# Patient Record
Sex: MALE | URBAN - METROPOLITAN AREA
[De-identification: names, ages, dates, MRNs, and addresses within clinical notes are randomized per-mention and may not be internally consistent; named-entity substitution may affect disease eponyms.]

---

## 2019-08-05 ENCOUNTER — OFFICE VISIT (OUTPATIENT)
Dept: URBAN - METROPOLITAN AREA CLINIC 76 | Facility: CLINIC | Age: 74
End: 2019-08-05
Payer: COMMERCIAL

## 2019-08-05 DIAGNOSIS — H25.13 AGE-RELATED NUCLEAR CATARACT, BILATERAL: ICD-10-CM

## 2019-08-05 DIAGNOSIS — H52.4 PRESBYOPIA: Primary | ICD-10-CM

## 2019-08-05 PROCEDURE — 92002 INTRM OPH EXAM NEW PATIENT: CPT | Performed by: OPTOMETRIST

## 2019-08-05 ASSESSMENT — INTRAOCULAR PRESSURE
OD: 14
OS: 13

## 2019-08-05 ASSESSMENT — KERATOMETRY
OS: 43.50
OD: 43.75

## 2019-08-05 ASSESSMENT — VISUAL ACUITY
OD: 20/25
OS: 20/25

## 2020-09-29 ENCOUNTER — LAB OUTSIDE AN ENCOUNTER (OUTPATIENT)
Dept: URBAN - METROPOLITAN AREA CLINIC 96 | Facility: CLINIC | Age: 75
End: 2020-09-29

## 2020-09-29 ENCOUNTER — OFFICE VISIT (OUTPATIENT)
Dept: URBAN - METROPOLITAN AREA CLINIC 96 | Facility: CLINIC | Age: 75
End: 2020-09-29
Payer: MEDICARE

## 2020-09-29 ENCOUNTER — WEB ENCOUNTER (OUTPATIENT)
Dept: URBAN - METROPOLITAN AREA CLINIC 96 | Facility: CLINIC | Age: 75
End: 2020-09-29

## 2020-09-29 DIAGNOSIS — R49.0 HOARSE VOICE QUALITY: ICD-10-CM

## 2020-09-29 DIAGNOSIS — K59.01 CONSTIPATION: ICD-10-CM

## 2020-09-29 DIAGNOSIS — Z09 FOLLOW UP: ICD-10-CM

## 2020-09-29 DIAGNOSIS — K21.9 GERD (GASTROESOPHAGEAL REFLUX DISEASE): ICD-10-CM

## 2020-09-29 DIAGNOSIS — R10.9 ABDOMINAL PAIN: ICD-10-CM

## 2020-09-29 PROCEDURE — G8482 FLU IMMUNIZE ORDER/ADMIN: HCPCS | Performed by: INTERNAL MEDICINE

## 2020-09-29 PROCEDURE — 1036F TOBACCO NON-USER: CPT | Performed by: INTERNAL MEDICINE

## 2020-09-29 PROCEDURE — 99214 OFFICE O/P EST MOD 30 MIN: CPT | Performed by: INTERNAL MEDICINE

## 2020-09-29 PROCEDURE — G8420 CALC BMI NORM PARAMETERS: HCPCS | Performed by: INTERNAL MEDICINE

## 2020-09-29 PROCEDURE — G9622 NO UNHEAL ETOH USER: HCPCS | Performed by: INTERNAL MEDICINE

## 2020-09-29 PROCEDURE — 3017F COLORECTAL CA SCREEN DOC REV: CPT | Performed by: INTERNAL MEDICINE

## 2020-09-29 PROCEDURE — G9903 PT SCRN TBCO ID AS NON USER: HCPCS | Performed by: INTERNAL MEDICINE

## 2020-09-29 PROCEDURE — G8427 DOCREV CUR MEDS BY ELIG CLIN: HCPCS | Performed by: INTERNAL MEDICINE

## 2020-09-29 RX ORDER — ESOMEPRAZOLE MAGNESIUM 20 MG
CAPSULE,DELAYED RELEASE (ENTERIC COATED) ORAL
Qty: 0 | Refills: 0 | Status: ACTIVE | COMMUNITY
Start: 1900-01-01 | End: 1900-01-01

## 2020-09-29 RX ORDER — UBIDECARENONE 100 MG
CAPSULE ORAL
Qty: 0 | Refills: 0 | Status: ACTIVE | COMMUNITY
Start: 1900-01-01 | End: 1900-01-01

## 2020-09-29 RX ORDER — BISOPROLOL FUMARATE 5 MG/1
TABLET, FILM COATED ORAL
Qty: 0 | Refills: 0 | Status: ACTIVE | COMMUNITY
Start: 1900-01-01 | End: 1900-01-01

## 2020-09-29 RX ORDER — OMEPRAZOLE 40 MG/1
1 CAPSULE 30 MINUTES BEFORE MEAL CAPSULE, DELAYED RELEASE ORAL BID
Qty: 60 | Refills: 11 | OUTPATIENT
Start: 2020-09-29

## 2020-09-29 RX ORDER — FINASTERIDE 5 MG/1
TABLET, FILM COATED ORAL
Qty: 0 | Refills: 0 | Status: ACTIVE | COMMUNITY
Start: 1900-01-01 | End: 1900-01-01

## 2020-09-29 RX ORDER — LISINOPRIL AND HYDROCHLOROTHIAZIDE TABLETS 20; 12.5 MG/1; MG/1
TAKE 1 TABLET BY ORAL ROUTE ONCE DAILY TABLET ORAL 1
Qty: 0 | Refills: 0 | Status: ACTIVE | COMMUNITY
Start: 1900-01-01 | End: 1900-01-01

## 2020-09-29 RX ORDER — FAMOTIDINE 40 MG/1
1 TAB TABLET, FILM COATED ORAL BID
Qty: 60 TABLET | Refills: 11 | OUTPATIENT
Start: 2020-09-29

## 2020-09-29 RX ORDER — SIMVASTATIN 40 MG/1
TABLET, FILM COATED ORAL
Qty: 0 | Refills: 0 | Status: ACTIVE | COMMUNITY
Start: 1900-01-01 | End: 1900-01-01

## 2020-09-29 RX ORDER — OMEPRAZOLE 40 MG/1
TAKE 1 CAPSULE BY ORAL ROUTE 2 TIMES A DAY FOR 90 DAYS CAPSULE, DELAYED RELEASE PELLETS ORAL 2
Qty: 180 | Refills: 3 | Status: ACTIVE | COMMUNITY
Start: 2019-11-22 | End: 2020-11-16

## 2020-09-29 NOTE — HPI-TODAY'S VISIT:
Pt Emeka is a 75 y/o individual with a h/o Payan's esophagus with h/o esophagitis, who is here for f/u of his condition. . He takes reglan, Carafate, and omeprazole. . Previously on 8/20/2019, he reports that he has no sxs of reflux.  No nausea, or vomiting.  No abdominal pain or dysphagia.   . Previously on 11/22/2019, pt reports that his throat sxs, abd pain and now constipation has started after stopping his acid therapy.  He takes lot of fiber, 2-tab magnesium, lot of water and exercise.  Has not a BM for about 3 days.  He is on Nexium 40mg once daily. . Today on 9/29/2020, pt reports that things have been going well overall.  He has been having a sore throat.  He fell down in Spring 2020, but has had persistent abdominal pain since then.  Pain also radiates to his right side as well.  Pain is severe in nature, nothing makes it better or worse. He has lost 24lbs of weight via special diet (Isogenic diet), no relation of new diet to his pain.  He has sore throat as well.   .  12/2019: CT abd: diverticulosis, urinary retention, small renal cysts (no further f/u nescssary)  2019: A. Gastric , Biopsy: -Minimal chronic non-active gastritis. -Alcian blue/PAS stain is negative for intestinal metaplasia. -Giemsa stain negative for Helicobacter pylori organisms. Immunohistochemical stain confirmatory. B. GE Junction , Biopsy: -Gastric cardia type mucosa with chronic inflammation and intestinal metaplasia (see comment). -Squamous mucosa with reflux associated changes. -Alcian blue/PAS stain confirms the presence of intestinal metaplasia. -Giemsa stain is negative for Helicobacter pylori organisms. -Negative for dysplasia or malignancy. B. If this biopsy is derived from endoscopically abnormal mucosa in the tubular esophagus, then the  findings meet the ACG diagnostic criteria of Payan's esophagus. Otherwise, this is considered to be  intestinal metaplasia at the GE junction. . 2018 - Esophageal mucosal changes secondary to established short-segment Payan's disease.  Biopsied. - Medium-sized hiatal hernia. - Clear gastric fluid. - Normal stomach. - Normal examined duodenum . - The examined portion of the ileum was normal. - One 5 mm polyp in the ascending colon, removed with a cold biopsy forceps.  Resected and retrieved. - Diverticulosis in the entire examined colon. - Internal hemorrhoids. - Tortuous colon. . A. Distal Esophagus , Biopsy: -Squamous and gastric cardia mucosa with no diagnostic abnormality. -Separate fragments of colonic mucosa with prominent lymphoid aggregates.  -The features of eosinophilic esophagitis are not present.  -Alcian blue/PAS stain is negative for intestinal metaplasia in the gastric mucosa; no fungal organisms are seen. B. Ascending Colon Polyp, Polypectomy: -Fragments of benign squamous mucosa.  -Scant unremarkable gastric epithelium.  -Colonic tissue is not present.  . PMH: HTN (on meds) FH: Colon cancer .

## 2020-10-07 ENCOUNTER — LAB OUTSIDE AN ENCOUNTER (OUTPATIENT)
Dept: URBAN - METROPOLITAN AREA CLINIC 96 | Facility: CLINIC | Age: 75
End: 2020-10-07

## 2020-10-13 ENCOUNTER — TELEPHONE ENCOUNTER (OUTPATIENT)
Dept: URBAN - METROPOLITAN AREA CLINIC 92 | Facility: CLINIC | Age: 75
End: 2020-10-13

## 2020-10-15 ENCOUNTER — TELEPHONE ENCOUNTER (OUTPATIENT)
Dept: URBAN - METROPOLITAN AREA CLINIC 92 | Facility: CLINIC | Age: 75
End: 2020-10-15

## 2020-10-21 ENCOUNTER — OFFICE VISIT (OUTPATIENT)
Dept: URBAN - METROPOLITAN AREA TELEHEALTH 2 | Facility: TELEHEALTH | Age: 75
End: 2020-10-21
Payer: MEDICARE

## 2020-10-21 DIAGNOSIS — R10.9 ABDOMINAL PAIN: ICD-10-CM

## 2020-10-21 DIAGNOSIS — K57.32 DIVERTICULITIS LARGE INTESTINE: ICD-10-CM

## 2020-10-21 DIAGNOSIS — K21.9 GERD (GASTROESOPHAGEAL REFLUX DISEASE): ICD-10-CM

## 2020-10-21 DIAGNOSIS — R93.5 ABNORMAL ABDOMINAL CT SCAN: ICD-10-CM

## 2020-10-21 DIAGNOSIS — Z09 FOLLOW UP: ICD-10-CM

## 2020-10-21 DIAGNOSIS — R49.0 HOARSE VOICE QUALITY: ICD-10-CM

## 2020-10-21 DIAGNOSIS — K59.01 CONSTIPATION: ICD-10-CM

## 2020-10-21 DIAGNOSIS — R10.84 ABDOMINAL CRAMPING, GENERALIZED: ICD-10-CM

## 2020-10-21 PROCEDURE — G9903 PT SCRN TBCO ID AS NON USER: HCPCS | Performed by: INTERNAL MEDICINE

## 2020-10-21 PROCEDURE — G8482 FLU IMMUNIZE ORDER/ADMIN: HCPCS | Performed by: INTERNAL MEDICINE

## 2020-10-21 PROCEDURE — 99215 OFFICE O/P EST HI 40 MIN: CPT | Performed by: INTERNAL MEDICINE

## 2020-10-21 PROCEDURE — 99214 OFFICE O/P EST MOD 30 MIN: CPT | Performed by: INTERNAL MEDICINE

## 2020-10-21 PROCEDURE — G8420 CALC BMI NORM PARAMETERS: HCPCS | Performed by: INTERNAL MEDICINE

## 2020-10-21 PROCEDURE — G8427 DOCREV CUR MEDS BY ELIG CLIN: HCPCS | Performed by: INTERNAL MEDICINE

## 2020-10-21 PROCEDURE — 1036F TOBACCO NON-USER: CPT | Performed by: INTERNAL MEDICINE

## 2020-10-21 PROCEDURE — G9622 NO UNHEAL ETOH USER: HCPCS | Performed by: INTERNAL MEDICINE

## 2020-10-21 PROCEDURE — 3017F COLORECTAL CA SCREEN DOC REV: CPT | Performed by: INTERNAL MEDICINE

## 2020-10-21 RX ORDER — FAMOTIDINE 40 MG/1
1 TAB TABLET, FILM COATED ORAL BID
Qty: 60 TABLET | Refills: 11 | OUTPATIENT

## 2020-10-21 RX ORDER — SIMVASTATIN 40 MG/1
TABLET, FILM COATED ORAL
Qty: 0 | Refills: 0 | Status: ACTIVE | COMMUNITY
Start: 1900-01-01 | End: 1900-01-01

## 2020-10-21 RX ORDER — OMEPRAZOLE 40 MG/1
1 CAPSULE 30 MINUTES BEFORE MEAL CAPSULE, DELAYED RELEASE ORAL BID
Qty: 60 | Refills: 11 | OUTPATIENT

## 2020-10-21 RX ORDER — ESOMEPRAZOLE MAGNESIUM 20 MG
CAPSULE,DELAYED RELEASE (ENTERIC COATED) ORAL
Qty: 0 | Refills: 0 | Status: ACTIVE | COMMUNITY
Start: 1900-01-01 | End: 1900-01-01

## 2020-10-21 RX ORDER — OMEPRAZOLE 40 MG/1
1 CAPSULE 30 MINUTES BEFORE MEAL CAPSULE, DELAYED RELEASE ORAL BID
Qty: 60 | Refills: 11 | Status: ACTIVE | COMMUNITY
Start: 2020-09-29

## 2020-10-21 RX ORDER — BISOPROLOL FUMARATE 5 MG/1
TABLET, FILM COATED ORAL
Qty: 0 | Refills: 0 | Status: ACTIVE | COMMUNITY
Start: 1900-01-01 | End: 1900-01-01

## 2020-10-21 RX ORDER — LISINOPRIL AND HYDROCHLOROTHIAZIDE TABLETS 20; 12.5 MG/1; MG/1
TAKE 1 TABLET BY ORAL ROUTE ONCE DAILY TABLET ORAL 1
Qty: 0 | Refills: 0 | Status: ACTIVE | COMMUNITY
Start: 1900-01-01 | End: 1900-01-01

## 2020-10-21 RX ORDER — FINASTERIDE 5 MG/1
TABLET, FILM COATED ORAL
Qty: 0 | Refills: 0 | Status: ACTIVE | COMMUNITY
Start: 1900-01-01 | End: 1900-01-01

## 2020-10-21 RX ORDER — METRONIDAZOLE 500 MG/1
1 TABLET TABLET, FILM COATED ORAL
Qty: 28 TABLET | Refills: 0 | OUTPATIENT
Start: 2020-10-21 | End: 2020-11-03

## 2020-10-21 RX ORDER — CIPROFLOXACIN HYDROCHLORIDE 500 MG/1
1 TABLET TABLET, FILM COATED ORAL
Qty: 28 TABLET | Refills: 0 | OUTPATIENT
Start: 2020-10-21 | End: 2020-11-03

## 2020-10-21 RX ORDER — UBIDECARENONE 100 MG
CAPSULE ORAL
Qty: 0 | Refills: 0 | Status: ACTIVE | COMMUNITY
Start: 1900-01-01 | End: 1900-01-01

## 2020-10-21 RX ORDER — FAMOTIDINE 40 MG/1
1 TAB TABLET, FILM COATED ORAL BID
Qty: 60 TABLET | Refills: 11 | Status: ACTIVE | COMMUNITY
Start: 2020-09-29

## 2020-10-21 RX ORDER — OMEPRAZOLE 40 MG/1
TAKE 1 CAPSULE BY ORAL ROUTE 2 TIMES A DAY FOR 90 DAYS CAPSULE, DELAYED RELEASE PELLETS ORAL 2
Qty: 180 | Refills: 3 | Status: ACTIVE | COMMUNITY
Start: 2019-11-22 | End: 2020-11-16

## 2020-10-21 NOTE — HPI-TODAY'S VISIT:
Pt Emeka is a 73 y/o individual with a h/o Payan's esophagus with h/o esophagitis, who is here for f/u of his condition. . He takes reglan, Carafate, and omeprazole. . Previously on 8/20/2019, he reports that he has no sxs of reflux.  No nausea, or vomiting.  No abdominal pain or dysphagia.   . Previously on 11/22/2019, pt reports that his throat sxs, abd pain and now constipation has started after stopping his acid therapy.  He takes lot of fiber, 2-tab magnesium, lot of water and exercise.  Has not a BM for about 3 days.  He is on Nexium 40mg once daily. . Previously on 9/29/2020, pt reports that things have been going well overall.  He has been having a sore throat.  He fell down in Spring 2020, but has had persistent abdominal pain since then.  Pain also radiates to his right side as well.  Pain is severe in nature, nothing makes it better or worse. He has lost 24lbs of weight via special diet (Isogenic diet), no relation of new diet to his pain.  He has sore throat as well.   . Today on 10/21/2020, pt reports that he continues have LLQ pain, severe, persistent and nothing makes it better or worse.  Had prostate surgery recently, had some complications from this (bleeding and pain).  . 10/2020: Possible distal sigmoid wall thickening, differential includes neoplasm or short segment colitis. Severe a compression of vertebra. Large Colonic fecal load. Heterogeneous prostate . 12/2019: CT abd: diverticulosis, urinary retention, small renal cysts (no further f/u nescssary)  2019: A. Gastric , Biopsy: -Minimal chronic non-active gastritis. -Alcian blue/PAS stain is negative for intestinal metaplasia. -Giemsa stain negative for Helicobacter pylori organisms. Immunohistochemical stain confirmatory. B. GE Junction , Biopsy: -Gastric cardia type mucosa with chronic inflammation and intestinal metaplasia (see comment). -Squamous mucosa with reflux associated changes. -Alcian blue/PAS stain confirms the presence of intestinal metaplasia. -Giemsa stain is negative for Helicobacter pylori organisms. -Negative for dysplasia or malignancy. B. If this biopsy is derived from endoscopically abnormal mucosa in the tubular esophagus, then the  findings meet the ACG diagnostic criteria of Payan's esophagus. Otherwise, this is considered to be  intestinal metaplasia at the GE junction. . 2018 - Esophageal mucosal changes secondary to established short-segment Payan's disease.  Biopsied. - Medium-sized hiatal hernia. - Clear gastric fluid. - Normal stomach. - Normal examined duodenum . - The examined portion of the ileum was normal. - One 5 mm polyp in the ascending colon, removed with a cold biopsy forceps.  Resected and retrieved. - Diverticulosis in the entire examined colon. - Internal hemorrhoids. - Tortuous colon. . A. Distal Esophagus , Biopsy: -Squamous and gastric cardia mucosa with no diagnostic abnormality. -Separate fragments of colonic mucosa with prominent lymphoid aggregates.  -The features of eosinophilic esophagitis are not present.  -Alcian blue/PAS stain is negative for intestinal metaplasia in the gastric mucosa; no fungal organisms are seen. B. Ascending Colon Polyp, Polypectomy: -Fragments of benign squamous mucosa.  -Scant unremarkable gastric epithelium.  -Colonic tissue is not present.  . PMH: HTN (on meds) FH: Colon cancer .

## 2020-10-22 ENCOUNTER — WEB ENCOUNTER (OUTPATIENT)
Dept: URBAN - METROPOLITAN AREA CLINIC 96 | Facility: CLINIC | Age: 75
End: 2020-10-22

## 2020-10-27 ENCOUNTER — WEB ENCOUNTER (OUTPATIENT)
Dept: URBAN - METROPOLITAN AREA CLINIC 96 | Facility: CLINIC | Age: 75
End: 2020-10-27

## 2020-11-06 ENCOUNTER — OFFICE VISIT (OUTPATIENT)
Dept: URBAN - METROPOLITAN AREA TELEHEALTH 2 | Facility: TELEHEALTH | Age: 75
End: 2020-11-06
Payer: MEDICARE

## 2020-11-06 ENCOUNTER — LAB OUTSIDE AN ENCOUNTER (OUTPATIENT)
Dept: URBAN - METROPOLITAN AREA TELEHEALTH 2 | Facility: TELEHEALTH | Age: 75
End: 2020-11-06

## 2020-11-06 DIAGNOSIS — K59.01 CONSTIPATION: ICD-10-CM

## 2020-11-06 DIAGNOSIS — K21.9 GERD (GASTROESOPHAGEAL REFLUX DISEASE): ICD-10-CM

## 2020-11-06 DIAGNOSIS — K57.32 DIVERTICULITIS LARGE INTESTINE: ICD-10-CM

## 2020-11-06 DIAGNOSIS — Z09 FOLLOW UP: ICD-10-CM

## 2020-11-06 DIAGNOSIS — R10.9 ABDOMINAL PAIN: ICD-10-CM

## 2020-11-06 DIAGNOSIS — R49.0 HOARSE VOICE QUALITY: ICD-10-CM

## 2020-11-06 DIAGNOSIS — R93.5 ABNORMAL ABDOMINAL CT SCAN: ICD-10-CM

## 2020-11-06 PROCEDURE — G9903 PT SCRN TBCO ID AS NON USER: HCPCS | Performed by: INTERNAL MEDICINE

## 2020-11-06 PROCEDURE — G9622 NO UNHEAL ETOH USER: HCPCS | Performed by: INTERNAL MEDICINE

## 2020-11-06 PROCEDURE — G8420 CALC BMI NORM PARAMETERS: HCPCS | Performed by: INTERNAL MEDICINE

## 2020-11-06 PROCEDURE — G8427 DOCREV CUR MEDS BY ELIG CLIN: HCPCS | Performed by: INTERNAL MEDICINE

## 2020-11-06 PROCEDURE — 99214 OFFICE O/P EST MOD 30 MIN: CPT | Performed by: INTERNAL MEDICINE

## 2020-11-06 PROCEDURE — 1036F TOBACCO NON-USER: CPT | Performed by: INTERNAL MEDICINE

## 2020-11-06 PROCEDURE — 3017F COLORECTAL CA SCREEN DOC REV: CPT | Performed by: INTERNAL MEDICINE

## 2020-11-06 PROCEDURE — G8482 FLU IMMUNIZE ORDER/ADMIN: HCPCS | Performed by: INTERNAL MEDICINE

## 2020-11-06 RX ORDER — BISOPROLOL FUMARATE 5 MG/1
TABLET, FILM COATED ORAL
Qty: 0 | Refills: 0 | Status: ACTIVE | COMMUNITY
Start: 1900-01-01 | End: 1900-01-01

## 2020-11-06 RX ORDER — ESOMEPRAZOLE MAGNESIUM 20 MG
CAPSULE,DELAYED RELEASE (ENTERIC COATED) ORAL
Qty: 0 | Refills: 0 | Status: ACTIVE | COMMUNITY
Start: 1900-01-01 | End: 1900-01-01

## 2020-11-06 RX ORDER — SIMVASTATIN 40 MG/1
TABLET, FILM COATED ORAL
Qty: 0 | Refills: 0 | Status: ACTIVE | COMMUNITY
Start: 1900-01-01 | End: 1900-01-01

## 2020-11-06 RX ORDER — FAMOTIDINE 40 MG/1
1 TAB TABLET, FILM COATED ORAL BID
Qty: 60 TABLET | Refills: 11 | Status: ACTIVE | COMMUNITY

## 2020-11-06 RX ORDER — FAMOTIDINE 40 MG/1
1 TAB TABLET, FILM COATED ORAL BID
Qty: 60 TABLET | Refills: 11 | OUTPATIENT

## 2020-11-06 RX ORDER — FINASTERIDE 5 MG/1
TABLET, FILM COATED ORAL
Qty: 0 | Refills: 0 | Status: ACTIVE | COMMUNITY
Start: 1900-01-01 | End: 1900-01-01

## 2020-11-06 RX ORDER — OMEPRAZOLE 40 MG/1
1 CAPSULE 30 MINUTES BEFORE MEAL CAPSULE, DELAYED RELEASE ORAL BID
Qty: 60 | Refills: 11 | OUTPATIENT

## 2020-11-06 RX ORDER — LISINOPRIL AND HYDROCHLOROTHIAZIDE TABLETS 20; 12.5 MG/1; MG/1
TAKE 1 TABLET BY ORAL ROUTE ONCE DAILY TABLET ORAL 1
Qty: 0 | Refills: 0 | Status: ACTIVE | COMMUNITY
Start: 1900-01-01 | End: 1900-01-01

## 2020-11-06 RX ORDER — OMEPRAZOLE 40 MG/1
TAKE 1 CAPSULE BY ORAL ROUTE 2 TIMES A DAY FOR 90 DAYS CAPSULE, DELAYED RELEASE PELLETS ORAL 2
Qty: 180 | Refills: 3 | Status: ACTIVE | COMMUNITY
Start: 2019-11-22 | End: 2020-11-16

## 2020-11-06 RX ORDER — OMEPRAZOLE 40 MG/1
1 CAPSULE 30 MINUTES BEFORE MEAL CAPSULE, DELAYED RELEASE ORAL BID
Qty: 60 | Refills: 11 | Status: ACTIVE | COMMUNITY

## 2020-11-06 RX ORDER — HYOSCYAMINE SULFATE 0.12 MG/1
1 TABLET AS NEEDED TABLET ORAL
Qty: 120 | Refills: 3 | OUTPATIENT
Start: 2020-11-06 | End: 2021-03-06

## 2020-11-06 RX ORDER — UBIDECARENONE 100 MG
CAPSULE ORAL
Qty: 0 | Refills: 0 | Status: ACTIVE | COMMUNITY
Start: 1900-01-01 | End: 1900-01-01

## 2020-11-06 NOTE — HPI-OTHER HISTORIES
Pt Emeka is a 75 y/o individual with a h/o Payan's esophagus with h/o esophagitis, who is here for f/u of his condition. . He takes reglan, Carafate, and omeprazole. . Previously on 8/20/2019, he reports that he has no sxs of reflux.  No nausea, or vomiting.  No abdominal pain or dysphagia.   . Previously on 11/22/2019, pt reports that his throat sxs, abd pain and now constipation has started after stopping his acid therapy.  He takes lot of fiber, 2-tab magnesium, lot of water and exercise.  Has not a BM for about 3 days.  He is on Nexium 40mg once daily. . Previously on 9/29/2020, pt reports that things have been going well overall.  He has been having a sore throat.  He fell down in Spring 2020, but has had persistent abdominal pain since then.  Pain also radiates to his right side as well.  Pain is severe in nature, nothing makes it better or worse. He has lost 24lbs of weight via special diet (Isogenic diet), no relation of new diet to his pain.  He has sore throat as well.   . Previously on 10/21/2020, pt reports that he continues have LLQ pain, severe, persistent and nothing makes it better or worse.  Had prostate surgery recently, had some complications from this (bleeding and pain). , Today on 11/6/2020, pt continues to have the abdominal pain, LLQ in nature, nothing making it better or worse.  Did not get better with the antibiotics.  Pain was there prior to his prostate surgery. . 10/2020: Possible distal sigmoid wall thickening, differential includes neoplasm or short segment colitis. Severe a compression of vertebra. Large Colonic fecal load. Heterogeneous prostate . 12/2019: CT abd: diverticulosis, urinary retention, small renal cysts (no further f/u nescssary)  2019: A. Gastric , Biopsy: -Minimal chronic non-active gastritis. -Alcian blue/PAS stain is negative for intestinal metaplasia. -Giemsa stain negative for Helicobacter pylori organisms. Immunohistochemical stain confirmatory. B. GE Junction , Biopsy: -Gastric cardia type mucosa with chronic inflammation and intestinal metaplasia (see comment). -Squamous mucosa with reflux associated changes. -Alcian blue/PAS stain confirms the presence of intestinal metaplasia. -Giemsa stain is negative for Helicobacter pylori organisms. -Negative for dysplasia or malignancy. B. If this biopsy is derived from endoscopically abnormal mucosa in the tubular esophagus, then the  findings meet the ACG diagnostic criteria of Payan's esophagus. Otherwise, this is considered to be  intestinal metaplasia at the GE junction. . 2018 - Esophageal mucosal changes secondary to established short-segment Payan's disease.  Biopsied. - Medium-sized hiatal hernia. - Clear gastric fluid. - Normal stomach. - Normal examined duodenum . - The examined portion of the ileum was normal. - One 5 mm polyp in the ascending colon, removed with a cold biopsy forceps.  Resected and retrieved. - Diverticulosis in the entire examined colon. - Internal hemorrhoids. - Tortuous colon. . A. Distal Esophagus , Biopsy: -Squamous and gastric cardia mucosa with no diagnostic abnormality. -Separate fragments of colonic mucosa with prominent lymphoid aggregates.  -The features of eosinophilic esophagitis are not present.  -Alcian blue/PAS stain is negative for intestinal metaplasia in the gastric mucosa; no fungal organisms are seen. B. Ascending Colon Polyp, Polypectomy: -Fragments of benign squamous mucosa.  -Scant unremarkable gastric epithelium.  -Colonic tissue is not present.  . PMH: HTN (on meds) FH: Colon cancer .

## 2020-11-12 ENCOUNTER — WEB ENCOUNTER (OUTPATIENT)
Dept: URBAN - METROPOLITAN AREA CLINIC 96 | Facility: CLINIC | Age: 75
End: 2020-11-12

## 2020-11-12 ENCOUNTER — TELEPHONE ENCOUNTER (OUTPATIENT)
Dept: URBAN - METROPOLITAN AREA CLINIC 96 | Facility: CLINIC | Age: 75
End: 2020-11-12

## 2020-11-17 ENCOUNTER — WEB ENCOUNTER (OUTPATIENT)
Dept: URBAN - METROPOLITAN AREA CLINIC 96 | Facility: CLINIC | Age: 75
End: 2020-11-17

## 2020-11-19 ENCOUNTER — LAB OUTSIDE AN ENCOUNTER (OUTPATIENT)
Dept: URBAN - METROPOLITAN AREA CLINIC 96 | Facility: CLINIC | Age: 75
End: 2020-11-19

## 2020-11-19 ENCOUNTER — WEB ENCOUNTER (OUTPATIENT)
Dept: URBAN - METROPOLITAN AREA CLINIC 96 | Facility: CLINIC | Age: 75
End: 2020-11-19

## 2020-11-19 LAB
CREATININE POC: 0.6
PERFORMING LAB: (no result)

## 2020-11-23 ENCOUNTER — TELEPHONE ENCOUNTER (OUTPATIENT)
Dept: URBAN - METROPOLITAN AREA CLINIC 92 | Facility: CLINIC | Age: 75
End: 2020-11-23

## 2020-11-30 ENCOUNTER — TELEPHONE ENCOUNTER (OUTPATIENT)
Dept: URBAN - METROPOLITAN AREA CLINIC 92 | Facility: CLINIC | Age: 75
End: 2020-11-30

## 2020-12-08 ENCOUNTER — WEB ENCOUNTER (OUTPATIENT)
Dept: URBAN - METROPOLITAN AREA CLINIC 96 | Facility: CLINIC | Age: 75
End: 2020-12-08

## 2020-12-08 ENCOUNTER — TELEPHONE ENCOUNTER (OUTPATIENT)
Dept: URBAN - METROPOLITAN AREA CLINIC 96 | Facility: CLINIC | Age: 75
End: 2020-12-08

## 2020-12-15 ENCOUNTER — LAB OUTSIDE AN ENCOUNTER (OUTPATIENT)
Dept: URBAN - METROPOLITAN AREA CLINIC 96 | Facility: CLINIC | Age: 75
End: 2020-12-15

## 2020-12-15 ENCOUNTER — OFFICE VISIT (OUTPATIENT)
Dept: URBAN - METROPOLITAN AREA CLINIC 96 | Facility: CLINIC | Age: 75
End: 2020-12-15
Payer: MEDICARE

## 2020-12-15 ENCOUNTER — WEB ENCOUNTER (OUTPATIENT)
Dept: URBAN - METROPOLITAN AREA CLINIC 96 | Facility: CLINIC | Age: 75
End: 2020-12-15

## 2020-12-15 DIAGNOSIS — R49.0 HOARSE VOICE QUALITY: ICD-10-CM

## 2020-12-15 DIAGNOSIS — K57.32 DIVERTICULITIS LARGE INTESTINE: ICD-10-CM

## 2020-12-15 DIAGNOSIS — K59.01 CONSTIPATION: ICD-10-CM

## 2020-12-15 DIAGNOSIS — Z09 FOLLOW UP: ICD-10-CM

## 2020-12-15 DIAGNOSIS — R10.9 ABDOMINAL PAIN: ICD-10-CM

## 2020-12-15 DIAGNOSIS — Z91.89 COLON CANCER HIGH RISK: ICD-10-CM

## 2020-12-15 DIAGNOSIS — K21.9 GERD (GASTROESOPHAGEAL REFLUX DISEASE): ICD-10-CM

## 2020-12-15 DIAGNOSIS — R93.5 ABNORMAL ABDOMINAL CT SCAN: ICD-10-CM

## 2020-12-15 PROCEDURE — G8427 DOCREV CUR MEDS BY ELIG CLIN: HCPCS | Performed by: INTERNAL MEDICINE

## 2020-12-15 PROCEDURE — G9903 PT SCRN TBCO ID AS NON USER: HCPCS | Performed by: INTERNAL MEDICINE

## 2020-12-15 PROCEDURE — G9622 NO UNHEAL ETOH USER: HCPCS | Performed by: INTERNAL MEDICINE

## 2020-12-15 PROCEDURE — 3017F COLORECTAL CA SCREEN DOC REV: CPT | Performed by: INTERNAL MEDICINE

## 2020-12-15 PROCEDURE — 1036F TOBACCO NON-USER: CPT | Performed by: INTERNAL MEDICINE

## 2020-12-15 PROCEDURE — G8420 CALC BMI NORM PARAMETERS: HCPCS | Performed by: INTERNAL MEDICINE

## 2020-12-15 PROCEDURE — G8482 FLU IMMUNIZE ORDER/ADMIN: HCPCS | Performed by: INTERNAL MEDICINE

## 2020-12-15 PROCEDURE — 99214 OFFICE O/P EST MOD 30 MIN: CPT | Performed by: INTERNAL MEDICINE

## 2020-12-15 RX ORDER — FAMOTIDINE 40 MG/1
1 TAB TABLET, FILM COATED ORAL BID
Qty: 60 TABLET | Refills: 11 | OUTPATIENT

## 2020-12-15 RX ORDER — FINASTERIDE 5 MG/1
TABLET, FILM COATED ORAL
Qty: 0 | Refills: 0 | Status: ACTIVE | COMMUNITY
Start: 1900-01-01 | End: 1900-01-01

## 2020-12-15 RX ORDER — CIPROFLOXACIN HYDROCHLORIDE 500 MG/1
1 TABLET TABLET, FILM COATED ORAL
Qty: 28 TABLET | Refills: 0 | OUTPATIENT
Start: 2020-12-15 | End: 2020-12-29

## 2020-12-15 RX ORDER — BISOPROLOL FUMARATE 5 MG/1
TABLET, FILM COATED ORAL
Qty: 0 | Refills: 0 | Status: ACTIVE | COMMUNITY
Start: 1900-01-01 | End: 1900-01-01

## 2020-12-15 RX ORDER — UBIDECARENONE 100 MG
CAPSULE ORAL
Qty: 0 | Refills: 0 | Status: ACTIVE | COMMUNITY
Start: 1900-01-01 | End: 1900-01-01

## 2020-12-15 RX ORDER — LISINOPRIL AND HYDROCHLOROTHIAZIDE TABLETS 20; 12.5 MG/1; MG/1
TAKE 1 TABLET BY ORAL ROUTE ONCE DAILY TABLET ORAL 1
Qty: 0 | Refills: 0 | Status: ACTIVE | COMMUNITY
Start: 1900-01-01 | End: 1900-01-01

## 2020-12-15 RX ORDER — ESOMEPRAZOLE MAGNESIUM 20 MG
CAPSULE,DELAYED RELEASE (ENTERIC COATED) ORAL
Qty: 0 | Refills: 0 | Status: ACTIVE | COMMUNITY
Start: 1900-01-01 | End: 1900-01-01

## 2020-12-15 RX ORDER — OMEPRAZOLE 40 MG/1
1 CAPSULE 30 MINUTES BEFORE MEAL CAPSULE, DELAYED RELEASE ORAL BID
Qty: 60 | Refills: 11 | OUTPATIENT

## 2020-12-15 RX ORDER — HYOSCYAMINE SULFATE 0.12 MG/1
1 TABLET AS NEEDED TABLET ORAL
Qty: 120 | Refills: 3 | Status: ACTIVE | COMMUNITY
Start: 2020-11-06 | End: 2021-03-06

## 2020-12-15 RX ORDER — METRONIDAZOLE 500 MG/1
1 TABLET TABLET, FILM COATED ORAL
Qty: 28 TABLET | Refills: 0 | OUTPATIENT
Start: 2020-12-15 | End: 2020-12-29

## 2020-12-15 RX ORDER — HYOSCYAMINE SULFATE 0.12 MG/1
1 TABLET AS NEEDED TABLET ORAL
Qty: 120 | Refills: 3 | OUTPATIENT

## 2020-12-15 RX ORDER — SIMVASTATIN 40 MG/1
TABLET, FILM COATED ORAL
Qty: 0 | Refills: 0 | Status: ACTIVE | COMMUNITY
Start: 1900-01-01 | End: 1900-01-01

## 2020-12-15 RX ORDER — FAMOTIDINE 40 MG/1
1 TAB TABLET, FILM COATED ORAL BID
Qty: 60 TABLET | Refills: 11 | Status: ACTIVE | COMMUNITY

## 2020-12-15 RX ORDER — OMEPRAZOLE 40 MG/1
1 CAPSULE 30 MINUTES BEFORE MEAL CAPSULE, DELAYED RELEASE ORAL BID
Qty: 60 | Refills: 11 | Status: ACTIVE | COMMUNITY

## 2020-12-15 NOTE — HPI-TODAY'S VISIT:
Pt Emeka is a 73 y/o individual with a h/o Payan's esophagus with h/o esophagitis, who is here for f/u of his condition. . He takes reglan, Carafate, and omeprazole. . Previously on 8/20/2019, he reports that he has no sxs of reflux.  No nausea, or vomiting.  No abdominal pain or dysphagia.   . Previously on 11/22/2019, pt reports that his throat sxs, abd pain and now constipation has started after stopping his acid therapy.  He takes lot of fiber, 2-tab magnesium, lot of water and exercise.  Has not a BM for about 3 days.  He is on Nexium 40mg once daily. . Previously on 9/29/2020, pt reports that things have been going well overall.  He has been having a sore throat.  He fell down in Spring 2020, but has had persistent abdominal pain since then.  Pain also radiates to his right side as well.  Pain is severe in nature, nothing makes it better or worse. He has lost 24lbs of weight via special diet (Isogenic diet), no relation of new diet to his pain.  He has sore throat as well.   . Previously on 10/21/2020, pt reports that he continues have LLQ pain, severe, persistent and nothing makes it better or worse.  Had prostate surgery recently, had some complications from this (bleeding and pain). , Previously on 11/6/2020, pt continues to have the abdominal pain, LLQ in nature, nothing making it better or worse.  Did not get better with the antibiotics.  Pain was there prior to his prostate surgery. . Today on 12/15/2020, pt reports that he has constant pain in his abdomen on the left and right side.  He is taking Isoflush (which has magnesium supplement in it).  Also taking citrcel 2 tab daily.  He was given a 6 days steroid pack, which helped his pain somewhat.  Was also given Tramadol for the pain.  (Did not want to take narcotic given the constipation). . CT ABD  11/2020: 1. Possible distal sigmoid colonic wall thickening was seen on a CT performed on October 7, 2020, but no definite colonic wall thickening is identified today. Correlation with recent colonoscopy findings is recommended. 2. Diffuse stool within the colon, concerning for constipation. 3. Stable severe compression fracture of L2. . 10/2020: Possible distal sigmoid wall thickening, differential includes neoplasm or short segment colitis. Severe a compression of vertebra. Large Colonic fecal load. Heterogeneous prostate . 12/2019: CT abd: diverticulosis, urinary retention, small renal cysts (no further f/u nescssary)  2019: A. Gastric , Biopsy: -Minimal chronic non-active gastritis. -Alcian blue/PAS stain is negative for intestinal metaplasia. -Giemsa stain negative for Helicobacter pylori organisms. Immunohistochemical stain confirmatory. B. GE Junction , Biopsy: -Gastric cardia type mucosa with chronic inflammation and intestinal metaplasia (see comment). -Squamous mucosa with reflux associated changes. -Alcian blue/PAS stain confirms the presence of intestinal metaplasia. -Giemsa stain is negative for Helicobacter pylori organisms. -Negative for dysplasia or malignancy. B. If this biopsy is derived from endoscopically abnormal mucosa in the tubular esophagus, then the  findings meet the [facility] diagnostic criteria of Payan's esophagus. Otherwise, this is considered to be  intestinal metaplasia at the GE junction. . 2018 - Esophageal mucosal changes secondary to established short-segment Payan's disease.  Biopsied. - Medium-sized hiatal hernia. - Clear gastric fluid. - Normal stomach. - Normal examined duodenum . - The examined portion of the ileum was normal. - One 5 mm polyp in the ascending colon, removed with a cold biopsy forceps.  Resected and retrieved. - Diverticulosis in the entire examined colon. - Internal hemorrhoids. - Tortuous colon. . A. Distal Esophagus , Biopsy: -Squamous and gastric cardia mucosa with no diagnostic abnormality. -Separate fragments of colonic mucosa with prominent lymphoid aggregates.  -The features of eosinophilic esophagitis are not present.  -Alcian blue/PAS stain is negative for intestinal metaplasia in the gastric mucosa; no fungal organisms are seen. B. Ascending Colon Polyp, Polypectomy: -Fragments of benign squamous mucosa.  -Scant unremarkable gastric epithelium.  -Colonic tissue is not present.  . PMH: HTN (on meds) FH: Colon cancer .

## 2020-12-18 ENCOUNTER — WEB ENCOUNTER (OUTPATIENT)
Dept: URBAN - METROPOLITAN AREA CLINIC 96 | Facility: CLINIC | Age: 75
End: 2020-12-18

## 2020-12-28 ENCOUNTER — WEB ENCOUNTER (OUTPATIENT)
Dept: URBAN - METROPOLITAN AREA CLINIC 96 | Facility: CLINIC | Age: 75
End: 2020-12-28

## 2021-01-20 ENCOUNTER — OFFICE VISIT (OUTPATIENT)
Dept: URBAN - METROPOLITAN AREA TELEHEALTH 2 | Facility: TELEHEALTH | Age: 76
End: 2021-01-20
Payer: MEDICARE

## 2021-01-20 DIAGNOSIS — K21.9 GERD (GASTROESOPHAGEAL REFLUX DISEASE): ICD-10-CM

## 2021-01-20 DIAGNOSIS — K59.01 CONSTIPATION: ICD-10-CM

## 2021-01-20 DIAGNOSIS — K57.32 DIVERTICULITIS LARGE INTESTINE: ICD-10-CM

## 2021-01-20 DIAGNOSIS — R10.84 GENERALIZED ABDOMINAL PAIN: ICD-10-CM

## 2021-01-20 PROCEDURE — G8420 CALC BMI NORM PARAMETERS: HCPCS | Performed by: INTERNAL MEDICINE

## 2021-01-20 PROCEDURE — G8482 FLU IMMUNIZE ORDER/ADMIN: HCPCS | Performed by: INTERNAL MEDICINE

## 2021-01-20 PROCEDURE — G9903 PT SCRN TBCO ID AS NON USER: HCPCS | Performed by: INTERNAL MEDICINE

## 2021-01-20 PROCEDURE — 1036F TOBACCO NON-USER: CPT | Performed by: INTERNAL MEDICINE

## 2021-01-20 PROCEDURE — G8427 DOCREV CUR MEDS BY ELIG CLIN: HCPCS | Performed by: INTERNAL MEDICINE

## 2021-01-20 PROCEDURE — G9622 NO UNHEAL ETOH USER: HCPCS | Performed by: INTERNAL MEDICINE

## 2021-01-20 PROCEDURE — 99215 OFFICE O/P EST HI 40 MIN: CPT | Performed by: INTERNAL MEDICINE

## 2021-01-20 PROCEDURE — 3017F COLORECTAL CA SCREEN DOC REV: CPT | Performed by: INTERNAL MEDICINE

## 2021-01-20 RX ORDER — NORTRIPTYLINE HYDROCHLORIDE 10 MG/1
2 CAPSULE CAPSULE ORAL ONCE A DAY
Qty: 60 CAPSULE | Refills: 11 | OUTPATIENT
Start: 2021-01-20

## 2021-01-20 RX ORDER — LISINOPRIL AND HYDROCHLOROTHIAZIDE TABLETS 20; 12.5 MG/1; MG/1
TAKE 1 TABLET BY ORAL ROUTE ONCE DAILY TABLET ORAL 1
Qty: 0 | Refills: 0 | Status: ACTIVE | COMMUNITY
Start: 1900-01-01 | End: 1900-01-01

## 2021-01-20 RX ORDER — OMEPRAZOLE 40 MG/1
1 CAPSULE 30 MINUTES BEFORE MEAL CAPSULE, DELAYED RELEASE ORAL BID
Qty: 60 | Refills: 11 | Status: ACTIVE | COMMUNITY

## 2021-01-20 RX ORDER — FAMOTIDINE 40 MG/1
1 TAB TABLET, FILM COATED ORAL BID
Qty: 60 TABLET | Refills: 11 | Status: ACTIVE | COMMUNITY

## 2021-01-20 RX ORDER — ESOMEPRAZOLE MAGNESIUM 20 MG
CAPSULE,DELAYED RELEASE (ENTERIC COATED) ORAL
Qty: 0 | Refills: 0 | Status: ACTIVE | COMMUNITY
Start: 1900-01-01 | End: 1900-01-01

## 2021-01-20 RX ORDER — FAMOTIDINE 40 MG/1
1 TAB TABLET, FILM COATED ORAL BID
Qty: 60 TABLET | Refills: 11 | OUTPATIENT

## 2021-01-20 RX ORDER — HYOSCYAMINE SULFATE 0.12 MG/1
1 TABLET AS NEEDED TABLET ORAL
Qty: 120 | Refills: 3 | Status: ACTIVE | COMMUNITY

## 2021-01-20 RX ORDER — UBIDECARENONE 100 MG
CAPSULE ORAL
Qty: 0 | Refills: 0 | Status: ACTIVE | COMMUNITY
Start: 1900-01-01 | End: 1900-01-01

## 2021-01-20 RX ORDER — SIMVASTATIN 40 MG/1
TABLET, FILM COATED ORAL
Qty: 0 | Refills: 0 | Status: ACTIVE | COMMUNITY
Start: 1900-01-01 | End: 1900-01-01

## 2021-01-20 RX ORDER — FINASTERIDE 5 MG/1
TABLET, FILM COATED ORAL
Qty: 0 | Refills: 0 | Status: ACTIVE | COMMUNITY
Start: 1900-01-01 | End: 1900-01-01

## 2021-01-20 RX ORDER — OMEPRAZOLE 40 MG/1
1 CAPSULE 30 MINUTES BEFORE MEAL CAPSULE, DELAYED RELEASE ORAL BID
Qty: 60 | Refills: 11 | OUTPATIENT

## 2021-01-20 RX ORDER — HYOSCYAMINE SULFATE 0.12 MG/1
1 TABLET AS NEEDED TABLET ORAL
Qty: 120 | Refills: 3 | OUTPATIENT

## 2021-01-20 RX ORDER — BISOPROLOL FUMARATE 5 MG/1
TABLET, FILM COATED ORAL
Qty: 0 | Refills: 0 | Status: ACTIVE | COMMUNITY
Start: 1900-01-01 | End: 1900-01-01

## 2021-01-20 NOTE — HPI-OTHER HISTORIES
Pt Emeka is a 76 y/o individual with a h/o Payan's esophagus with h/o esophagitis, who is here for f/u of his condition. . He takes reglan, Carafate, and omeprazole. . Previously on 8/20/2019, he reports that he has no sxs of reflux.  No nausea, or vomiting.  No abdominal pain or dysphagia.   . Previously on 11/22/2019, pt reports that his throat sxs, abd pain and now constipation has started after stopping his acid therapy.  He takes lot of fiber, 2-tab magnesium, lot of water and exercise.  Has not a BM for about 3 days.  He is on Nexium 40mg once daily. . Previously on 9/29/2020, pt reports that things have been going well overall.  He has been having a sore throat.  He fell down in Spring 2020, but has had persistent abdominal pain since then.  Pain also radiates to his right side as well.  Pain is severe in nature, nothing makes it better or worse. He has lost 24lbs of weight via special diet (Isogenic diet), no relation of new diet to his pain.  He has sore throat as well.   . Previously on 10/21/2020, pt reports that he continues have LLQ pain, severe, persistent and nothing makes it better or worse.  Had prostate surgery recently, had some complications from this (bleeding and pain). , Previously on 11/6/2020, pt continues to have the abdominal pain, LLQ in nature, nothing making it better or worse.  Did not get better with the antibiotics.  Pain was there prior to his prostate surgery. . Previously on 12/15/2020, pt reports that he has constant pain in his abdomen on the left and right side.  He is taking Isoflush (which has magnesium supplement in it).  Also taking citrcel 2 tab daily.  He was given a 6 days steroid pack, which helped his pain somewhat.  Was also given Tramadol for the pain.  (Did not want to take narcotic given the constipation). . Today on 1/19/2021, pt reports that he injured his back last week (saw PCP and started on muscle relaxer and pain pill).  He completed the 2 weeks of antibiotics course in 12/2020, did not see much improvement in his abdominal pain.  Also seeing some pain in the right side of the abdomen, feels like sore muscles in the rib cage.  Persistent sore throat, which has been linked to his sinus drainage.  Nothing makes the abd pain better or worse.  He has been on magnesium for many years, isoflush as well and fiber one cereal.  Having daily BM.  . CT ABD  11/2020: 1. Possible distal sigmoid colonic wall thickening was seen on a CT performed on October 7, 2020, but no definite colonic wall thickening is identified today. Correlation with recent colonoscopy findings is recommended. 2. Diffuse stool within the colon, concerning for constipation. 3. Stable severe compression fracture of L2. . 10/2020: Possible distal sigmoid wall thickening, differential includes neoplasm or short segment colitis. Severe a compression of vertebra. Large Colonic fecal load. Heterogeneous prostate . 12/2019: CT abd: diverticulosis, urinary retention, small renal cysts (no further f/u nescssary)  2019: A. Gastric , Biopsy: -Minimal chronic non-active gastritis. -Alcian blue/PAS stain is negative for intestinal metaplasia. -Giemsa stain negative for Helicobacter pylori organisms. Immunohistochemical stain confirmatory. B. GE Junction , Biopsy: -Gastric cardia type mucosa with chronic inflammation and intestinal metaplasia (see comment). -Squamous mucosa with reflux associated changes. -Alcian blue/PAS stain confirms the presence of intestinal metaplasia. -Giemsa stain is negative for Helicobacter pylori organisms. -Negative for dysplasia or malignancy. B. If this biopsy is derived from endoscopically abnormal mucosa in the tubular esophagus, then the  findings meet the ACG diagnostic criteria of Payan's esophagus. Otherwise, this is considered to be  intestinal metaplasia at the GE junction. . 2018 - Esophageal mucosal changes secondary to established short-segment Payan's disease.  Biopsied. - Medium-sized hiatal hernia. - Clear gastric fluid. - Normal stomach. - Normal examined duodenum . - The examined portion of the ileum was normal. - One 5 mm polyp in the ascending colon, removed with a cold biopsy forceps.  Resected and retrieved. - Diverticulosis in the entire examined colon. - Internal hemorrhoids. - Tortuous colon. . A. Distal Esophagus , Biopsy: -Squamous and gastric cardia mucosa with no diagnostic abnormality. -Separate fragments of colonic mucosa with prominent lymphoid aggregates.  -The features of eosinophilic esophagitis are not present.  -Alcian blue/PAS stain is negative for intestinal metaplasia in the gastric mucosa; no fungal organisms are seen. B. Ascending Colon Polyp, Polypectomy: -Fragments of benign squamous mucosa.  -Scant unremarkable gastric epithelium.  -Colonic tissue is not present.  . PMH: HTN (on meds) FH: Colon cancer

## 2021-01-21 ENCOUNTER — WEB ENCOUNTER (OUTPATIENT)
Dept: URBAN - METROPOLITAN AREA CLINIC 96 | Facility: CLINIC | Age: 76
End: 2021-01-21

## 2021-01-22 ENCOUNTER — WEB ENCOUNTER (OUTPATIENT)
Dept: URBAN - METROPOLITAN AREA CLINIC 96 | Facility: CLINIC | Age: 76
End: 2021-01-22

## 2021-01-23 ENCOUNTER — WEB ENCOUNTER (OUTPATIENT)
Dept: URBAN - METROPOLITAN AREA CLINIC 96 | Facility: CLINIC | Age: 76
End: 2021-01-23

## 2021-01-25 ENCOUNTER — WEB ENCOUNTER (OUTPATIENT)
Dept: URBAN - METROPOLITAN AREA CLINIC 96 | Facility: CLINIC | Age: 76
End: 2021-01-25

## 2021-01-29 ENCOUNTER — WEB ENCOUNTER (OUTPATIENT)
Dept: URBAN - METROPOLITAN AREA CLINIC 96 | Facility: CLINIC | Age: 76
End: 2021-01-29

## 2021-02-04 ENCOUNTER — WEB ENCOUNTER (OUTPATIENT)
Dept: URBAN - METROPOLITAN AREA CLINIC 96 | Facility: CLINIC | Age: 76
End: 2021-02-04

## 2021-03-09 ENCOUNTER — WEB ENCOUNTER (OUTPATIENT)
Dept: URBAN - METROPOLITAN AREA CLINIC 96 | Facility: CLINIC | Age: 76
End: 2021-03-09

## 2021-03-09 ENCOUNTER — LAB OUTSIDE AN ENCOUNTER (OUTPATIENT)
Dept: URBAN - METROPOLITAN AREA CLINIC 96 | Facility: CLINIC | Age: 76
End: 2021-03-09

## 2021-03-09 ENCOUNTER — OFFICE VISIT (OUTPATIENT)
Dept: URBAN - METROPOLITAN AREA CLINIC 96 | Facility: CLINIC | Age: 76
End: 2021-03-09
Payer: MEDICARE

## 2021-03-09 DIAGNOSIS — K22.70 BARRETT ESOPHAGUS: ICD-10-CM

## 2021-03-09 DIAGNOSIS — K63.5 COLON POLYP: ICD-10-CM

## 2021-03-09 DIAGNOSIS — R93.5 ABNORMAL ABDOMINAL CT SCAN: ICD-10-CM

## 2021-03-09 DIAGNOSIS — K59.01 CONSTIPATION: ICD-10-CM

## 2021-03-09 DIAGNOSIS — K58.9 IBS (IRRITABLE BOWEL SYNDROME): ICD-10-CM

## 2021-03-09 DIAGNOSIS — Z09 FOLLOW UP: ICD-10-CM

## 2021-03-09 DIAGNOSIS — R10.9 ABDOMINAL PAIN: ICD-10-CM

## 2021-03-09 DIAGNOSIS — K57.32 DIVERTICULITIS LARGE INTESTINE: ICD-10-CM

## 2021-03-09 DIAGNOSIS — R49.0 HOARSE VOICE QUALITY: ICD-10-CM

## 2021-03-09 DIAGNOSIS — Z91.89 COLON CANCER HIGH RISK: ICD-10-CM

## 2021-03-09 DIAGNOSIS — K21.9 GERD (GASTROESOPHAGEAL REFLUX DISEASE): ICD-10-CM

## 2021-03-09 PROCEDURE — 99214 OFFICE O/P EST MOD 30 MIN: CPT | Performed by: INTERNAL MEDICINE

## 2021-03-09 RX ORDER — LISINOPRIL AND HYDROCHLOROTHIAZIDE TABLETS 20; 12.5 MG/1; MG/1
TAKE 1 TABLET BY ORAL ROUTE ONCE DAILY TABLET ORAL 1
Qty: 0 | Refills: 0 | COMMUNITY
Start: 1900-01-01

## 2021-03-09 RX ORDER — HYOSCYAMINE SULFATE 0.12 MG/1
1 TABLET AS NEEDED TABLET ORAL
Qty: 120 | Refills: 3 | COMMUNITY

## 2021-03-09 RX ORDER — ESOMEPRAZOLE MAGNESIUM 20 MG
CAPSULE,DELAYED RELEASE (ENTERIC COATED) ORAL
Qty: 0 | Refills: 0 | COMMUNITY
Start: 1900-01-01

## 2021-03-09 RX ORDER — SODIUM, POTASSIUM,MAG SULFATES 17.5-3.13G
TAKE 177 ML SOLUTION, RECONSTITUTED, ORAL ORAL
Qty: 177 ML | Refills: 0 | OUTPATIENT
Start: 2021-03-09 | End: 2021-03-10

## 2021-03-09 RX ORDER — NORTRIPTYLINE HYDROCHLORIDE 10 MG/1
2 CAPSULE CAPSULE ORAL ONCE A DAY
Qty: 60 CAPSULE | Refills: 11 | COMMUNITY
Start: 2021-01-20

## 2021-03-09 RX ORDER — FAMOTIDINE 40 MG/1
1 TAB TABLET, FILM COATED ORAL BID
Qty: 60 TABLET | Refills: 11 | COMMUNITY

## 2021-03-09 RX ORDER — OMEPRAZOLE 40 MG/1
1 CAPSULE 30 MINUTES BEFORE MEAL CAPSULE, DELAYED RELEASE ORAL BID
Qty: 60 | Refills: 11 | OUTPATIENT

## 2021-03-09 RX ORDER — BISOPROLOL FUMARATE 5 MG/1
TABLET, FILM COATED ORAL
Qty: 0 | Refills: 0 | COMMUNITY
Start: 1900-01-01

## 2021-03-09 RX ORDER — UBIDECARENONE 100 MG
CAPSULE ORAL
Qty: 0 | Refills: 0 | COMMUNITY
Start: 1900-01-01

## 2021-03-09 RX ORDER — FINASTERIDE 5 MG/1
TABLET, FILM COATED ORAL
Qty: 0 | Refills: 0 | COMMUNITY
Start: 1900-01-01

## 2021-03-09 RX ORDER — HYOSCYAMINE SULFATE 0.12 MG/1
1 TABLET AS NEEDED TABLET ORAL
Qty: 120 | Refills: 3 | OUTPATIENT

## 2021-03-09 RX ORDER — SIMVASTATIN 40 MG/1
TABLET, FILM COATED ORAL
Qty: 0 | Refills: 0 | COMMUNITY
Start: 1900-01-01

## 2021-03-09 RX ORDER — OMEPRAZOLE 40 MG/1
1 CAPSULE 30 MINUTES BEFORE MEAL CAPSULE, DELAYED RELEASE ORAL BID
Qty: 60 | Refills: 11 | COMMUNITY

## 2021-03-09 RX ORDER — NORTRIPTYLINE HYDROCHLORIDE 10 MG/1
2 CAPSULE CAPSULE ORAL ONCE A DAY
Qty: 60 CAPSULE | Refills: 11 | OUTPATIENT

## 2021-03-09 RX ORDER — FAMOTIDINE 40 MG/1
1 TAB TABLET, FILM COATED ORAL BID
Qty: 60 TABLET | Refills: 11 | OUTPATIENT

## 2021-03-09 NOTE — HPI-OTHER HISTORIES
Pt Emeka is a 74 y/o individual with a h/o Payan's esophagus with h/o esophagitis, who is here for f/u of his condition. . He takes reglan, Carafate, and omeprazole. . Previously on 8/20/2019, he reports that he has no sxs of reflux.  No nausea, or vomiting.  No abdominal pain or dysphagia.   . Previously on 11/22/2019, pt reports that his throat sxs, abd pain and now constipation has started after stopping his acid therapy.  He takes lot of fiber, 2-tab magnesium, lot of water and exercise.  Has not a BM for about 3 days.  He is on Nexium 40mg once daily. . Previously on 9/29/2020, pt reports that things have been going well overall.  He has been having a sore throat.  He fell down in Spring 2020, but has had persistent abdominal pain since then.  Pain also radiates to his right side as well.  Pain is severe in nature, nothing makes it better or worse. He has lost 24lbs of weight via special diet (Isogenic diet), no relation of new diet to his pain.  He has sore throat as well.   . Previously on 10/21/2020, pt reports that he continues have LLQ pain, severe, persistent and nothing makes it better or worse.  Had prostate surgery recently, had some complications from this (bleeding and pain). , Previously on 11/6/2020, pt continues to have the abdominal pain, LLQ in nature, nothing making it better or worse.  Did not get better with the antibiotics.  Pain was there prior to his prostate surgery. . Previously on 12/15/2020, pt reports that he has constant pain in his abdomen on the left and right side.  He is taking Isoflush (which has magnesium supplement in it).  Also taking citrcel 2 tab daily.  He was given a 6 days steroid pack, which helped his pain somewhat.  Was also given Tramadol for the pain.  (Did not want to take narcotic given the constipation). . Previously on 1/19/2021, pt reports that he injured his back last week (saw PCP and started on muscle relaxer and pain pill).  He completed the 2 weeks of antibiotics course in 12/2020, did not see much improvement in his abdominal pain.  Also seeing some pain in the right side of the abdomen, feels like sore muscles in the rib cage.  Persistent sore throat, which has been linked to his sinus drainage.  Nothing makes the abd pain better or worse.  He has been on magnesium for many years, isoflush as well and fiber one cereal.  Having daily BM. . Today on 3/8/2021, pt reports that his abd pain is persistent but varies throughout the day, worse with activity.  Feels like muscle pain (had recent fall, but present for 1 year).  Having 2 BM per day with the high bran diet, isoflush, 2 Magoxide 400mg, 2 citrcels per day.  .  Took nortryptyline, 2 tab per day, which did not help much for the pain (but did help his back pain). . CT ABD  11/2020: 1. Possible distal sigmoid colonic wall thickening was seen on a CT performed on October 7, 2020, but no definite colonic wall thickening is identified today. Correlation with recent colonoscopy findings is recommended. 2. Diffuse stool within the colon, concerning for constipation. 3. Stable severe compression fracture of L2. . 10/2020: Possible distal sigmoid wall thickening, differential includes neoplasm or short segment colitis. Severe a compression of vertebra. Large Colonic fecal load. Heterogeneous prostate . 12/2019: CT abd: diverticulosis, urinary retention, small renal cysts (no further f/u nescssary)  2019: A. Gastric , Biopsy: -Minimal chronic non-active gastritis. -Alcian blue/PAS stain is negative for intestinal metaplasia. -Giemsa stain negative for Helicobacter pylori organisms. Immunohistochemical stain confirmatory. B. GE Junction , Biopsy: -Gastric cardia type mucosa with chronic inflammation and intestinal metaplasia (see comment). -Squamous mucosa with reflux associated changes. -Alcian blue/PAS stain confirms the presence of intestinal metaplasia. -Giemsa stain is negative for Helicobacter pylori organisms. -Negative for dysplasia or malignancy. B. If this biopsy is derived from endoscopically abnormal mucosa in the tubular esophagus, then the  findings meet the ACG diagnostic criteria of Payan's esophagus. Otherwise, this is considered to be  intestinal metaplasia at the GE junction. . 2018 - Esophageal mucosal changes secondary to established short-segment Payan's disease.  Biopsied. - Medium-sized hiatal hernia. - Clear gastric fluid. - Normal stomach. - Normal examined duodenum . - The examined portion of the ileum was normal. - One 5 mm polyp in the ascending colon, removed with a cold biopsy forceps.  Resected and retrieved. - Diverticulosis in the entire examined colon. - Internal hemorrhoids. - Tortuous colon. . A. Distal Esophagus , Biopsy: -Squamous and gastric cardia mucosa with no diagnostic abnormality. -Separate fragments of colonic mucosa with prominent lymphoid aggregates.  -The features of eosinophilic esophagitis are not present.  -Alcian blue/PAS stain is negative for intestinal metaplasia in the gastric mucosa; no fungal organisms are seen. B. Ascending Colon Polyp, Polypectomy: -Fragments of benign squamous mucosa.  -Scant unremarkable gastric epithelium.  -Colonic tissue is not present.  . PMH: HTN (on meds) FH: Colon cancer .

## 2021-03-12 ENCOUNTER — WEB ENCOUNTER (OUTPATIENT)
Dept: URBAN - METROPOLITAN AREA CLINIC 96 | Facility: CLINIC | Age: 76
End: 2021-03-12

## 2021-03-19 ENCOUNTER — WEB ENCOUNTER (OUTPATIENT)
Dept: URBAN - METROPOLITAN AREA CLINIC 96 | Facility: CLINIC | Age: 76
End: 2021-03-19

## 2021-03-25 ENCOUNTER — CLAIMS CREATED FROM THE CLAIM WINDOW (OUTPATIENT)
Dept: URBAN - METROPOLITAN AREA CLINIC 4 | Facility: CLINIC | Age: 76
End: 2021-03-25
Payer: MEDICARE

## 2021-03-25 ENCOUNTER — OFFICE VISIT (OUTPATIENT)
Dept: URBAN - METROPOLITAN AREA SURGERY CENTER 18 | Facility: SURGERY CENTER | Age: 76
End: 2021-03-25
Payer: MEDICARE

## 2021-03-25 DIAGNOSIS — K31.89 ACQUIRED DEFORMITY OF DUODENUM: ICD-10-CM

## 2021-03-25 DIAGNOSIS — K31.89 ISOLATED IDIOPATHIC GRANULOMA OF STOMACH: ICD-10-CM

## 2021-03-25 DIAGNOSIS — Z86.010 H/O ADENOMATOUS POLYP OF COLON: ICD-10-CM

## 2021-03-25 DIAGNOSIS — R12 BURNING REFLUX: ICD-10-CM

## 2021-03-25 PROCEDURE — 88305 TISSUE EXAM BY PATHOLOGIST: CPT | Performed by: PATHOLOGY

## 2021-03-25 PROCEDURE — G8907 PT DOC NO EVENTS ON DISCHARG: HCPCS | Performed by: INTERNAL MEDICINE

## 2021-03-25 PROCEDURE — 43239 EGD BIOPSY SINGLE/MULTIPLE: CPT | Performed by: INTERNAL MEDICINE

## 2021-03-25 PROCEDURE — 88312 SPECIAL STAINS GROUP 1: CPT | Performed by: PATHOLOGY

## 2021-03-25 PROCEDURE — G0105 COLORECTAL SCRN; HI RISK IND: HCPCS | Performed by: INTERNAL MEDICINE

## 2021-03-25 RX ORDER — FAMOTIDINE 40 MG/1
1 TAB TABLET, FILM COATED ORAL BID
Qty: 60 TABLET | Refills: 11 | Status: ACTIVE | COMMUNITY

## 2021-03-25 RX ORDER — LISINOPRIL AND HYDROCHLOROTHIAZIDE TABLETS 20; 12.5 MG/1; MG/1
TAKE 1 TABLET BY ORAL ROUTE ONCE DAILY TABLET ORAL 1
Qty: 0 | Refills: 0 | COMMUNITY
Start: 1900-01-01

## 2021-03-25 RX ORDER — BISOPROLOL FUMARATE 5 MG/1
TABLET, FILM COATED ORAL
Qty: 0 | Refills: 0 | COMMUNITY
Start: 1900-01-01

## 2021-03-25 RX ORDER — NORTRIPTYLINE HYDROCHLORIDE 10 MG/1
2 CAPSULE CAPSULE ORAL ONCE A DAY
Qty: 60 CAPSULE | Refills: 11 | Status: ACTIVE | COMMUNITY

## 2021-03-25 RX ORDER — UBIDECARENONE 100 MG
CAPSULE ORAL
Qty: 0 | Refills: 0 | COMMUNITY
Start: 1900-01-01

## 2021-03-25 RX ORDER — FINASTERIDE 5 MG/1
TABLET, FILM COATED ORAL
Qty: 0 | Refills: 0 | COMMUNITY
Start: 1900-01-01

## 2021-03-25 RX ORDER — HYOSCYAMINE SULFATE 0.12 MG/1
1 TABLET AS NEEDED TABLET ORAL
Qty: 120 | Refills: 3 | Status: ACTIVE | COMMUNITY

## 2021-03-25 RX ORDER — OMEPRAZOLE 40 MG/1
1 CAPSULE 30 MINUTES BEFORE MEAL CAPSULE, DELAYED RELEASE ORAL BID
Qty: 60 | Refills: 11 | Status: ACTIVE | COMMUNITY

## 2021-03-25 RX ORDER — SIMVASTATIN 40 MG/1
TABLET, FILM COATED ORAL
Qty: 0 | Refills: 0 | COMMUNITY
Start: 1900-01-01

## 2021-03-25 RX ORDER — ESOMEPRAZOLE MAGNESIUM 20 MG
CAPSULE,DELAYED RELEASE (ENTERIC COATED) ORAL
Qty: 0 | Refills: 0 | COMMUNITY
Start: 1900-01-01

## 2021-03-31 ENCOUNTER — WEB ENCOUNTER (OUTPATIENT)
Dept: URBAN - METROPOLITAN AREA CLINIC 96 | Facility: CLINIC | Age: 76
End: 2021-03-31

## 2021-04-09 ENCOUNTER — OFFICE VISIT (OUTPATIENT)
Dept: URBAN - METROPOLITAN AREA TELEHEALTH 2 | Facility: TELEHEALTH | Age: 76
End: 2021-04-09
Payer: MEDICARE

## 2021-04-09 DIAGNOSIS — K21.9 GERD (GASTROESOPHAGEAL REFLUX DISEASE): ICD-10-CM

## 2021-04-09 DIAGNOSIS — K57.32 DIVERTICULITIS LARGE INTESTINE: ICD-10-CM

## 2021-04-09 DIAGNOSIS — K59.01 CONSTIPATION: ICD-10-CM

## 2021-04-09 DIAGNOSIS — R10.84 GENERALIZED ABDOMINAL PAIN: ICD-10-CM

## 2021-04-09 PROBLEM — 302914006 BARRETT ESOPHAGUS: Status: ACTIVE | Noted: 2021-03-09

## 2021-04-09 PROCEDURE — 99214 OFFICE O/P EST MOD 30 MIN: CPT | Performed by: INTERNAL MEDICINE

## 2021-04-09 RX ORDER — NORTRIPTYLINE HYDROCHLORIDE 10 MG/1
2 CAPSULE CAPSULE ORAL ONCE A DAY
Qty: 60 CAPSULE | Refills: 11 | OUTPATIENT

## 2021-04-09 RX ORDER — HYOSCYAMINE SULFATE 0.12 MG/1
1 TABLET AS NEEDED TABLET ORAL
Qty: 120 | Refills: 3 | OUTPATIENT

## 2021-04-09 RX ORDER — ESOMEPRAZOLE MAGNESIUM 20 MG
CAPSULE,DELAYED RELEASE (ENTERIC COATED) ORAL
Qty: 0 | Refills: 0 | Status: DISCONTINUED | COMMUNITY
Start: 1900-01-01

## 2021-04-09 RX ORDER — UBIDECARENONE 100 MG
CAPSULE ORAL
Qty: 0 | Refills: 0 | COMMUNITY
Start: 1900-01-01

## 2021-04-09 RX ORDER — SIMVASTATIN 40 MG/1
TABLET, FILM COATED ORAL
Qty: 0 | Refills: 0 | COMMUNITY
Start: 1900-01-01

## 2021-04-09 RX ORDER — NORTRIPTYLINE HYDROCHLORIDE 10 MG/1
2 CAPSULE CAPSULE ORAL ONCE A DAY
Qty: 60 CAPSULE | Refills: 11 | Status: DISCONTINUED | COMMUNITY

## 2021-04-09 RX ORDER — LISINOPRIL AND HYDROCHLOROTHIAZIDE TABLETS 20; 12.5 MG/1; MG/1
TAKE 1 TABLET BY ORAL ROUTE ONCE DAILY TABLET ORAL 1
Qty: 0 | Refills: 0 | COMMUNITY
Start: 1900-01-01

## 2021-04-09 RX ORDER — FAMOTIDINE 40 MG/1
1 TAB TABLET, FILM COATED ORAL BID
Qty: 60 TABLET | Refills: 11 | Status: DISCONTINUED | COMMUNITY

## 2021-04-09 RX ORDER — HYOSCYAMINE SULFATE 0.12 MG/1
1 TABLET AS NEEDED TABLET ORAL
Qty: 120 | Refills: 3 | COMMUNITY

## 2021-04-09 RX ORDER — OMEPRAZOLE 40 MG/1
1 CAPSULE 30 MINUTES BEFORE MEAL CAPSULE, DELAYED RELEASE ORAL BID
Qty: 60 | Refills: 11 | OUTPATIENT

## 2021-04-09 RX ORDER — FAMOTIDINE 40 MG/1
1 TAB TABLET, FILM COATED ORAL BID
Qty: 60 TABLET | Refills: 11 | OUTPATIENT

## 2021-04-09 RX ORDER — BISOPROLOL FUMARATE 5 MG/1
TABLET, FILM COATED ORAL
Qty: 0 | Refills: 0 | COMMUNITY
Start: 1900-01-01

## 2021-04-09 RX ORDER — OMEPRAZOLE 40 MG/1
1 CAPSULE 30 MINUTES BEFORE MEAL CAPSULE, DELAYED RELEASE ORAL BID
Qty: 60 | Refills: 11 | COMMUNITY

## 2021-04-09 RX ORDER — FINASTERIDE 5 MG/1
TABLET, FILM COATED ORAL
Qty: 0 | Refills: 0 | COMMUNITY
Start: 1900-01-01

## 2021-04-09 NOTE — HPI-TODAY'S VISIT:
Pt Emeka is a 76 y/o individual with a h/o Payan's esophagus with h/o esophagitis, who is here for f/u of his condition. . He takes reglan, Carafate, and omeprazole. . Previously on 8/20/2019, he reports that he has no sxs of reflux.  No nausea, or vomiting.  No abdominal pain or dysphagia.   . Previously on 11/22/2019, pt reports that his throat sxs, abd pain and now constipation has started after stopping his acid therapy.  He takes lot of fiber, 2-tab magnesium, lot of water and exercise.  Has not a BM for about 3 days.  He is on Nexium 40mg once daily. . Previously on 9/29/2020, pt reports that things have been going well overall.  He has been having a sore throat.  He fell down in Spring 2020, but has had persistent abdominal pain since then.  Pain also radiates to his right side as well.  Pain is severe in nature, nothing makes it better or worse. He has lost 24lbs of weight via special diet (Isogenic diet), no relation of new diet to his pain.  He has sore throat as well.   . Previously on 10/21/2020, pt reports that he continues have LLQ pain, severe, persistent and nothing makes it better or worse.  Had prostate surgery recently, had some complications from this (bleeding and pain). , Previously on 11/6/2020, pt continues to have the abdominal pain, LLQ in nature, nothing making it better or worse.  Did not get better with the antibiotics.  Pain was there prior to his prostate surgery. . Previously on 12/15/2020, pt reports that he has constant pain in his abdomen on the left and right side.  He is taking Isoflush (which has magnesium supplement in it).  Also taking citrcel 2 tab daily.  He was given a 6 days steroid pack, which helped his pain somewhat.  Was also given Tramadol for the pain.  (Did not want to take narcotic given the constipation). . Previously on 1/19/2021, pt reports that he injured his back last week (saw PCP and started on muscle relaxer and pain pill).  He completed the 2 weeks of antibiotics course in 12/2020, did not see much improvement in his abdominal pain.  Also seeing some pain in the right side of the abdomen, feels like sore muscles in the rib cage.  Persistent sore throat, which has been linked to his sinus drainage.  Nothing makes the abd pain better or worse.  He has been on magnesium for many years, isoflush as well and fiber one cereal.  Having daily BM. . Previously on 3/8/2021, pt reports that his abd pain is persistent but varies throughout the day, worse with activity.  Feels like muscle pain (had recent fall, but present for 1 year).  Having 2 BM per day with the high bran diet, isoflush, 2 Magoxide 400mg, 2 citrcels per day.  .  Took nortryptyline, 2 tab per day, which did not help much for the pain (but did help his back pain). . Today on 4/9/2021, pt reports that he had colonoscopy performed that revealed pan-diverticulosis.  Continues to have the abdominal pain, varies based on activity level, severe and diffuse.  Having good daily BM.  no improvement on elavil. . 3/2021: - Diverticulosis in the recto-sigmoid colon, in the sigmoid colon and in the descending colon. - The entire examined colon is normal on direct and retroflexion views. EGD: - Normal esophagus. - Normal examined duodenum. - Erythematous mucosa in the stomach. Biopsied. - Normal esophagus. . CT ABD  11/2020: 1. Possible distal sigmoid colonic wall thickening was seen on a CT performed on October 7, 2020, but no definite colonic wall thickening is identified today. Correlation with recent colonoscopy findings is recommended. 2. Diffuse stool within the colon, concerning for constipation. 3. Stable severe compression fracture of L2. . 10/2020: Possible distal sigmoid wall thickening, differential includes neoplasm or short segment colitis. Severe a compression of vertebra. Large Colonic fecal load. Heterogeneous prostate . 12/2019: CT abd: diverticulosis, urinary retention, small renal cysts (no further f/u nescssary)  2019: A. Gastric , Biopsy: -Minimal chronic non-active gastritis. -Alcian blue/PAS stain is negative for intestinal metaplasia. -Giemsa stain negative for Helicobacter pylori organisms. Immunohistochemical stain confirmatory. B. GE Junction , Biopsy: -Gastric cardia type mucosa with chronic inflammation and intestinal metaplasia (see comment). -Squamous mucosa with reflux associated changes. -Alcian blue/PAS stain confirms the presence of intestinal metaplasia. -Giemsa stain is negative for Helicobacter pylori organisms. -Negative for dysplasia or malignancy. B. If this biopsy is derived from endoscopically abnormal mucosa in the tubular esophagus, then the  findings meet the ACG diagnostic criteria of Payan's esophagus. Otherwise, this is considered to be  intestinal metaplasia at the GE junction. . 2018 - Esophageal mucosal changes secondary to established short-segment Payan's disease.  Biopsied. - Medium-sized hiatal hernia. - Clear gastric fluid. - Normal stomach. - Normal examined duodenum . - The examined portion of the ileum was normal. - One 5 mm polyp in the ascending colon, removed with a cold biopsy forceps.  Resected and retrieved. - Diverticulosis in the entire examined colon. - Internal hemorrhoids. - Tortuous colon. . A. Distal Esophagus , Biopsy: -Squamous and gastric cardia mucosa with no diagnostic abnormality. -Separate fragments of colonic mucosa with prominent lymphoid aggregates.  -The features of eosinophilic esophagitis are not present.  -Alcian blue/PAS stain is negative for intestinal metaplasia in the gastric mucosa; no fungal organisms are seen. B. Ascending Colon Polyp, Polypectomy: -Fragments of benign squamous mucosa.  -Scant unremarkable gastric epithelium.  -Colonic tissue is not present.  . PMH: HTN (on meds) FH: Colon cancer .

## 2021-06-02 ENCOUNTER — WEB ENCOUNTER (OUTPATIENT)
Dept: URBAN - METROPOLITAN AREA CLINIC 96 | Facility: CLINIC | Age: 76
End: 2021-06-02

## 2021-07-05 ENCOUNTER — WEB ENCOUNTER (OUTPATIENT)
Dept: URBAN - METROPOLITAN AREA CLINIC 96 | Facility: CLINIC | Age: 76
End: 2021-07-05

## 2021-07-14 ENCOUNTER — WEB ENCOUNTER (OUTPATIENT)
Dept: URBAN - METROPOLITAN AREA CLINIC 96 | Facility: CLINIC | Age: 76
End: 2021-07-14

## 2021-07-14 ENCOUNTER — OFFICE VISIT (OUTPATIENT)
Dept: URBAN - METROPOLITAN AREA TELEHEALTH 2 | Facility: TELEHEALTH | Age: 76
End: 2021-07-14
Payer: MEDICARE

## 2021-07-14 DIAGNOSIS — K57.32 DIVERTICULITIS LARGE INTESTINE: ICD-10-CM

## 2021-07-14 DIAGNOSIS — R10.84 ABDOMINAL CRAMPING, GENERALIZED: ICD-10-CM

## 2021-07-14 DIAGNOSIS — K21.9 GERD (GASTROESOPHAGEAL REFLUX DISEASE): ICD-10-CM

## 2021-07-14 DIAGNOSIS — K58.1 IRRITABLE BOWEL SYNDROME WITH CONSTIPATION: ICD-10-CM

## 2021-07-14 PROCEDURE — 99214 OFFICE O/P EST MOD 30 MIN: CPT | Performed by: INTERNAL MEDICINE

## 2021-07-14 RX ORDER — FINASTERIDE 5 MG/1
TABLET, FILM COATED ORAL
Qty: 0 | Refills: 0 | COMMUNITY
Start: 1900-01-01

## 2021-07-14 RX ORDER — FAMOTIDINE 40 MG/1
1 TAB TABLET, FILM COATED ORAL BID
Qty: 60 TABLET | Refills: 11 | OUTPATIENT

## 2021-07-14 RX ORDER — SIMVASTATIN 40 MG/1
TABLET, FILM COATED ORAL
Qty: 0 | Refills: 0 | COMMUNITY
Start: 1900-01-01

## 2021-07-14 RX ORDER — BISOPROLOL FUMARATE 5 MG/1
TABLET, FILM COATED ORAL
Qty: 0 | Refills: 0 | COMMUNITY
Start: 1900-01-01

## 2021-07-14 RX ORDER — FAMOTIDINE 40 MG/1
1 TAB TABLET, FILM COATED ORAL BID
Qty: 60 TABLET | Refills: 11 | Status: ACTIVE | COMMUNITY

## 2021-07-14 RX ORDER — HYOSCYAMINE SULFATE 0.12 MG/1
1 TABLET AS NEEDED TABLET ORAL
Qty: 120 | Refills: 3 | COMMUNITY

## 2021-07-14 RX ORDER — LINACLOTIDE 72 UG/1
1 CAPSULE AT LEAST 30 MINUTES BEFORE THE FIRST MEAL OF THE DAY ON AN EMPTY STOMACH CAPSULE, GELATIN COATED ORAL ONCE A DAY
Qty: 90 | Refills: 4 | OUTPATIENT
Start: 2021-07-14 | End: 2022-10-07

## 2021-07-14 RX ORDER — HYOSCYAMINE SULFATE 0.12 MG/1
1 TABLET AS NEEDED TABLET ORAL
Qty: 120 | Refills: 3 | OUTPATIENT

## 2021-07-14 RX ORDER — OMEPRAZOLE 40 MG/1
1 CAPSULE 30 MINUTES BEFORE MEAL CAPSULE, DELAYED RELEASE ORAL BID
Qty: 60 | Refills: 11 | OUTPATIENT

## 2021-07-14 RX ORDER — LISINOPRIL AND HYDROCHLOROTHIAZIDE TABLETS 20; 12.5 MG/1; MG/1
TAKE 1 TABLET BY ORAL ROUTE ONCE DAILY TABLET ORAL 1
Qty: 0 | Refills: 0 | COMMUNITY
Start: 1900-01-01

## 2021-07-14 RX ORDER — LINACLOTIDE 72 UG/1
1 CAPSULE AT LEAST 30 MINUTES BEFORE THE FIRST MEAL OF THE DAY ON AN EMPTY STOMACH CAPSULE, GELATIN COATED ORAL ONCE A DAY
Qty: 90 | Refills: 4
Start: 2021-07-14 | End: 2022-10-07

## 2021-07-14 RX ORDER — NORTRIPTYLINE HYDROCHLORIDE 10 MG/1
2 CAPSULE CAPSULE ORAL ONCE A DAY
Qty: 60 CAPSULE | Refills: 11 | OUTPATIENT

## 2021-07-14 RX ORDER — UBIDECARENONE 100 MG
CAPSULE ORAL
Qty: 0 | Refills: 0 | COMMUNITY
Start: 1900-01-01

## 2021-07-14 RX ORDER — NORTRIPTYLINE HYDROCHLORIDE 10 MG/1
2 CAPSULE CAPSULE ORAL ONCE A DAY
Qty: 60 CAPSULE | Refills: 11 | Status: ACTIVE | COMMUNITY

## 2021-07-14 RX ORDER — OMEPRAZOLE 40 MG/1
1 CAPSULE 30 MINUTES BEFORE MEAL CAPSULE, DELAYED RELEASE ORAL BID
Qty: 60 | Refills: 11 | Status: ACTIVE | COMMUNITY

## 2021-07-15 ENCOUNTER — WEB ENCOUNTER (OUTPATIENT)
Dept: URBAN - METROPOLITAN AREA CLINIC 96 | Facility: CLINIC | Age: 76
End: 2021-07-15

## 2021-10-04 ENCOUNTER — WEB ENCOUNTER (OUTPATIENT)
Dept: URBAN - METROPOLITAN AREA CLINIC 96 | Facility: CLINIC | Age: 76
End: 2021-10-04

## 2021-10-22 ENCOUNTER — DASHBOARD ENCOUNTERS (OUTPATIENT)
Age: 76
End: 2021-10-22

## 2021-10-22 ENCOUNTER — OFFICE VISIT (OUTPATIENT)
Dept: URBAN - METROPOLITAN AREA TELEHEALTH 2 | Facility: TELEHEALTH | Age: 76
End: 2021-10-22
Payer: MEDICARE

## 2021-10-22 DIAGNOSIS — K46.9 HERNIA: ICD-10-CM

## 2021-10-22 DIAGNOSIS — K21.9 GERD (GASTROESOPHAGEAL REFLUX DISEASE): ICD-10-CM

## 2021-10-22 DIAGNOSIS — K57.32 DIVERTICULITIS LARGE INTESTINE: ICD-10-CM

## 2021-10-22 DIAGNOSIS — R93.5 ABNORMAL ABDOMINAL CT SCAN: ICD-10-CM

## 2021-10-22 DIAGNOSIS — K58.1 IRRITABLE BOWEL SYNDROME WITH CONSTIPATION: ICD-10-CM

## 2021-10-22 DIAGNOSIS — R10.84 ABDOMINAL PAIN, GENERALIZED: ICD-10-CM

## 2021-10-22 PROBLEM — 440630006: Status: ACTIVE | Noted: 2021-07-14

## 2021-10-22 PROBLEM — 14760008 CONSTIPATION: Status: ACTIVE | Noted: 2020-09-29

## 2021-10-22 PROBLEM — 235595009 GASTROESOPHAGEAL REFLUX DISEASE: Status: ACTIVE | Noted: 2020-09-29

## 2021-10-22 PROBLEM — 10743008 IRRITABLE BOWEL SYNDROME: Status: ACTIVE | Noted: 2021-01-20

## 2021-10-22 PROBLEM — 111359004 DIVERTICULITIS OF COLON: Status: ACTIVE | Noted: 2020-10-21

## 2021-10-22 PROCEDURE — 99214 OFFICE O/P EST MOD 30 MIN: CPT | Performed by: INTERNAL MEDICINE

## 2021-10-22 RX ORDER — HYOSCYAMINE SULFATE 0.12 MG/1
1 TABLET AS NEEDED TABLET ORAL
Qty: 120 | Refills: 3 | COMMUNITY

## 2021-10-22 RX ORDER — LISINOPRIL AND HYDROCHLOROTHIAZIDE TABLETS 20; 12.5 MG/1; MG/1
TAKE 1 TABLET BY ORAL ROUTE ONCE DAILY TABLET ORAL 1
Qty: 0 | Refills: 0 | COMMUNITY
Start: 1900-01-01

## 2021-10-22 RX ORDER — HYOSCYAMINE SULFATE 0.12 MG/1
1 TABLET AS NEEDED TABLET ORAL
Qty: 120 | Refills: 3 | OUTPATIENT

## 2021-10-22 RX ORDER — SIMVASTATIN 40 MG/1
TABLET, FILM COATED ORAL
Qty: 0 | Refills: 0 | COMMUNITY
Start: 1900-01-01

## 2021-10-22 RX ORDER — UBIDECARENONE 100 MG
CAPSULE ORAL
Qty: 0 | Refills: 0 | COMMUNITY
Start: 1900-01-01

## 2021-10-22 RX ORDER — OMEPRAZOLE 40 MG/1
1 CAPSULE 30 MINUTES BEFORE MEAL CAPSULE, DELAYED RELEASE ORAL BID
Qty: 60 | Refills: 11 | OUTPATIENT

## 2021-10-22 RX ORDER — NORTRIPTYLINE HYDROCHLORIDE 10 MG/1
2 CAPSULE CAPSULE ORAL ONCE A DAY
Qty: 60 CAPSULE | Refills: 11 | COMMUNITY

## 2021-10-22 RX ORDER — FINASTERIDE 5 MG/1
TABLET, FILM COATED ORAL
Qty: 0 | Refills: 0 | COMMUNITY
Start: 1900-01-01

## 2021-10-22 RX ORDER — NORTRIPTYLINE HYDROCHLORIDE 10 MG/1
2 CAPSULE CAPSULE ORAL ONCE A DAY
Qty: 60 CAPSULE | Refills: 11 | OUTPATIENT

## 2021-10-22 RX ORDER — FAMOTIDINE 40 MG/1
1 TAB TABLET, FILM COATED ORAL BID
Qty: 60 TABLET | Refills: 11 | COMMUNITY

## 2021-10-22 RX ORDER — BISOPROLOL FUMARATE 5 MG/1
TABLET, FILM COATED ORAL
Qty: 0 | Refills: 0 | COMMUNITY
Start: 1900-01-01

## 2021-10-22 RX ORDER — LINACLOTIDE 72 UG/1
1 CAPSULE AT LEAST 30 MINUTES BEFORE THE FIRST MEAL OF THE DAY ON AN EMPTY STOMACH CAPSULE, GELATIN COATED ORAL ONCE A DAY
Qty: 90 | Refills: 4 | COMMUNITY
Start: 2021-07-14 | End: 2022-10-07

## 2021-10-22 RX ORDER — OMEPRAZOLE 40 MG/1
1 CAPSULE 30 MINUTES BEFORE MEAL CAPSULE, DELAYED RELEASE ORAL BID
Qty: 60 | Refills: 11 | COMMUNITY

## 2021-10-22 RX ORDER — FAMOTIDINE 40 MG/1
1 TAB TABLET, FILM COATED ORAL BID
Qty: 60 TABLET | Refills: 11 | OUTPATIENT

## 2021-10-22 RX ORDER — LINACLOTIDE 72 UG/1
1 CAPSULE AT LEAST 30 MINUTES BEFORE THE FIRST MEAL OF THE DAY ON AN EMPTY STOMACH CAPSULE, GELATIN COATED ORAL ONCE A DAY
Qty: 90 | Refills: 4 | OUTPATIENT

## 2021-10-22 NOTE — HPI-TODAY'S VISIT:
Pt Emeka is a 74 y/o individual with a h/o Payan's esophagus with h/o esophagitis, who is here for f/u of his condition. . He takes reglan, Carafate, and omeprazole. . Previously on 8/20/2019, he reports that he has no sxs of reflux.  No nausea, or vomiting.  No abdominal pain or dysphagia.   . Previously on 11/22/2019, pt reports that his throat sxs, abd pain and now constipation has started after stopping his acid therapy.  He takes lot of fiber, 2-tab magnesium, lot of water and exercise.  Has not a BM for about 3 days.  He is on Nexium 40mg once daily. . Previously on 9/29/2020, pt reports that things have been going well overall.  He has been having a sore throat.  He fell down in Spring 2020, but has had persistent abdominal pain since then.  Pain also radiates to his right side as well.  Pain is severe in nature, nothing makes it better or worse. He has lost 24lbs of weight via special diet (Isogenic diet), no relation of new diet to his pain.  He has sore throat as well.   . Previously on 10/21/2020, pt reports that he continues have LLQ pain, severe, persistent and nothing makes it better or worse.  Had prostate surgery recently, had some complications from this (bleeding and pain). , Previously on 11/6/2020, pt continues to have the abdominal pain, LLQ in nature, nothing making it better or worse.  Did not get better with the antibiotics.  Pain was there prior to his prostate surgery. . Previously on 12/15/2020, pt reports that he has constant pain in his abdomen on the left and right side.  He is taking Isoflush (which has magnesium supplement in it).  Also taking citrcel 2 tab daily.  He was given a 6 days steroid pack, which helped his pain somewhat.  Was also given Tramadol for the pain.  (Did not want to take narcotic given the constipation). . Previously on 1/19/2021, pt reports that he injured his back last week (saw PCP and started on muscle relaxer and pain pill).  He completed the 2 weeks of antibiotics course in 12/2020, did not see much improvement in his abdominal pain.  Also seeing some pain in the right side of the abdomen, feels like sore muscles in the rib cage.  Persistent sore throat, which has been linked to his sinus drainage.  Nothing makes the abd pain better or worse.  He has been on magnesium for many years, isoflush as well and fiber one cereal.  Having daily BM. . Previously on 3/8/2021, pt reports that his abd pain is persistent but varies throughout the day, worse with activity.  Feels like muscle pain (had recent fall, but present for 1 year).  Having 2 BM per day with the high bran diet, isoflush, 2 Magoxide 400mg, 2 citrcels per day.  .  Took nortryptyline, 2 tab per day, which did not help much for the pain (but did help his back pain). Previously on 4/9/2021, pt reports that he had colonoscopy performed that revealed pan-diverticulosis.  Continues to have the abdominal pain, varies based on activity level, severe and diffuse.  Having good daily BM.  no improvement on elavil. Previously on 7/14/2021, pt reports that after the EGD/Colonoscopy, he got off of omeprazole, but the burning/gerd came back.  He then restarted the omeprazole, which improved the sxs.  The abdominal pain has come back again, in the LLQ, from bottom edge of rib cage to groin; hurts all day/every day.  It worse with activity.  The BM are fine, 3 per day, loose in nature; takes isoflush supplement. . Today on 10/22/2021, pt reports that after starting Linzess, he initially had some diarrhea, but stopped his isoflush.  He seems to now have more normal BM and slight improvement in his abdominal pain.  He then re-started 2 isoflush tablets, however, this exacerbates his pain.  The pain is quite significant, radiates to his left side.  He is also developing a hernia and is seeing a surgeon for this.  . 3/2021: - Diverticulosis in the recto-sigmoid colon, in the sigmoid colon and in the descending colon. - The entire examined colon is normal on direct and retroflexion views. EGD: - Normal esophagus. - Normal examined duodenum. - Erythematous mucosa in the stomach. Biopsied. - Normal esophagus. . CT ABD  11/2020: 1. Possible distal sigmoid colonic wall thickening was seen on a CT performed on October 7, 2020, but no definite colonic wall thickening is identified today. Correlation with recent colonoscopy findings is recommended. 2. Diffuse stool within the colon, concerning for constipation. 3. Stable severe compression fracture of L2. . 10/2020: Possible distal sigmoid wall thickening, differential includes neoplasm or short segment colitis. Severe a compression of vertebra. Large Colonic fecal load. Heterogeneous prostate . 12/2019: CT abd: diverticulosis, urinary retention, small renal cysts (no further f/u nescssary)   3/2021: Stomach, Biopsy: NO SIGNIFICANT ABNORMALITY.  Diverticulosis in the recto-sigmoid colon, in the sigmoid colon and in the descending colon. - The entire examined colon is normal on direct and retroflexion views. . . 2019: A. Gastric , Biopsy: -Minimal chronic non-active gastritis. -Alcian blue/PAS stain is negative for intestinal metaplasia. -Giemsa stain negative for Helicobacter pylori organisms. Immunohistochemical stain confirmatory. B. GE Junction , Biopsy: -Gastric cardia type mucosa with chronic inflammation and intestinal metaplasia (see comment). -Squamous mucosa with reflux associated changes. -Alcian blue/PAS stain confirms the presence of intestinal metaplasia. -Giemsa stain is negative for Helicobacter pylori organisms. -Negative for dysplasia or malignancy. B. If this biopsy is derived from endoscopically abnormal mucosa in the tubular esophagus, then the  findings meet the ACG diagnostic criteria of Payan's esophagus. Otherwise, this is considered to be  intestinal metaplasia at the GE junction. . 2018 - Esophageal mucosal changes secondary to established short-segment Payan's disease.  Biopsied. - Medium-sized hiatal hernia. - Clear gastric fluid. - Normal stomach. - Normal examined duodenum . - The examined portion of the ileum was normal. - One 5 mm polyp in the ascending colon, removed with a cold biopsy forceps.  Resected and retrieved. - Diverticulosis in the entire examined colon. - Internal hemorrhoids. - Tortuous colon. . A. Distal Esophagus , Biopsy: -Squamous and gastric cardia mucosa with no diagnostic abnormality. -Separate fragments of colonic mucosa with prominent lymphoid aggregates.  -The features of eosinophilic esophagitis are not present.  -Alcian blue/PAS stain is negative for intestinal metaplasia in the gastric mucosa; no fungal organisms are seen. B. Ascending Colon Polyp, Polypectomy: -Fragments of benign squamous mucosa.  -Scant unremarkable gastric epithelium.  -Colonic tissue is not present.  . PMH: HTN (on meds) FH: Colon cancer .

## 2023-11-18 NOTE — PHYSICAL EXAM CARDIOVASCULAR:
no edema,  no murmurs,  regular rate and rhythm , no edema. Return to the ED in 14 days for removal of sutures. Please follow up with Dr. Brock in 1 week for wound check. Please rest and elevate your leg when possible. Please ambulate with crutches in order to limit movement of your foot. Please return to the ED sooner than 14 days if you notice redness of the skin, warmth, drainage, or other new/concerning symptoms. Take Keflex for 7 days to prevent infection.     Our Emergency Department Referral Coordinators will be reaching out to you in the next 24-48 hours from 9:00am to 5:00pm with a follow up appointment. Please expect a phone call from the hospital in that time frame. If you do not receive a call or if you have any questions or concerns, you can reach them at [phone]     Laceration    WHAT YOU NEED TO KNOW:    A laceration is an injury to the skin and the soft tissue underneath it. Lacerations happen when you are cut or hit by something. They can happen anywhere on the body.     DISCHARGE INSTRUCTIONS:    Return to the emergency department if:     You have heavy bleeding or bleeding that does not stop after 10 minutes of holding firm, direct pressure over the wound.       Your wound opens up.     Contact your healthcare provider if:     You have a fever or chills.       Your laceration is red, warm, or swollen.      You have red streaks on your skin coming from your wound.      You have white or yellow drainage from the wound that smells bad.      You have pain that gets worse, even after treatment.       You have questions or concerns about your condition or care.     Medicines:     Prescription pain medicine may be given. Ask how to take this medicine safely.       Antibiotics help treat or prevent a bacterial infection.       Take your medicine as directed. Contact your healthcare provider if you think your medicine is not helping or if you have side effects. Tell him or her if you are allergic to any medicine. Keep a list of the medicines, vitamins, and herbs you take. Include the amounts, and when and why you take them. Bring the list or the pill bottles to follow-up visits. Carry your medicine list with you in case of an emergency.    Care for your wound as directed:     Do not get your wound wet until your healthcare provider says it is okay. Do not soak your wound in water. Do not go swimming until your healthcare provider says it is okay. Carefully wash the wound with soap and water. Gently pat the area dry or allow it to air dry.       Change your bandages when they get wet, dirty, or after washing. Apply new, clean bandages as directed. Do not apply elastic bandages or tape too tight. Do not put powders or lotions over your incision.       Apply antibiotic ointment as directed. Your healthcare provider may give you antibiotic ointment to put over your wound if you have stitches. If you have strips of tape over your incision, let them dry up and fall off on their own. If they do not fall off within 14 days, gently remove them. If you have glue over your wound, do not remove or pick at it. If your glue comes off, do not replace it with glue that you have at home.       Check your wound every day for signs of infection such as swelling, redness, or pus.     Self-care:     Apply ice on your wound for 15 to 20 minutes every hour or as directed. Use an ice pack, or put crushed ice in a plastic bag. Cover it with a towel. Ice helps prevent tissue damage and decreases swelling and pain.      Use a splint as directed. A splint will decrease movement and stress on your wound. It may help it heal faster. A splint may be used for lacerations over joints or areas of your body that bend. Ask your healthcare provider how to apply and remove a splint.       Decrease scarring of your wound by applying ointments as directed. Do not apply ointments until your healthcare provider says it is okay. You may need to wait until your wound is healed. Ask which ointment to buy and how often to use it. After your wound is healed, use sunscreen over the area when you are out in the sun. You should do this for at least 6 months to 1 year after your injury.     Follow up with your healthcare provider as directed: You may need to follow up in 24 to 48 hours to have your wound checked for infection. You will need to return in 3 to 14 days if you have stitches or staples so they can be removed. Care for your wound as directed to prevent infection and help it heal. Write down your questions so you remember to ask them during your visits.       © Copyright [employer] 2019 All illustrations and images included in CareNotes are the copyrighted property of [employer] or [employer]. Return to the ED in 14 days for removal of sutures. Please follow up with Dr. Brock in 1 week for wound check. Please rest and elevate your leg when possible. Please ambulate with crutches in order to limit movement of your foot. Please return to the ED sooner than 14 days if you notice redness of the skin, warmth, drainage, or other new/concerning symptoms. Take Keflex for 7 days to prevent infection.     Our Emergency Department Referral Coordinators will be reaching out to you in the next 24-48 hours from 9:00am to 5:00pm with a follow up appointment. Please expect a phone call from the hospital in that time frame. If you do not receive a call or if you have any questions or concerns, you can reach them at [phone]     Laceration    WHAT YOU NEED TO KNOW:    A laceration is an injury to the skin and the soft tissue underneath it. Lacerations happen when you are cut or hit by something. They can happen anywhere on the body.     DISCHARGE INSTRUCTIONS:    Return to the emergency department if:     You have heavy bleeding or bleeding that does not stop after 10 minutes of holding firm, direct pressure over the wound.       Your wound opens up.     Contact your healthcare provider if:     You have a fever or chills.       Your laceration is red, warm, or swollen.      You have red streaks on your skin coming from your wound.      You have white or yellow drainage from the wound that smells bad.      You have pain that gets worse, even after treatment.       You have questions or concerns about your condition or care.     Medicines:     Prescription pain medicine may be given. Ask how to take this medicine safely.       Antibiotics help treat or prevent a bacterial infection.       Take your medicine as directed. Contact your healthcare provider if you think your medicine is not helping or if you have side effects. Tell him or her if you are allergic to any medicine. Keep a list of the medicines, vitamins, and herbs you take. Include the amounts, and when and why you take them. Bring the list or the pill bottles to follow-up visits. Carry your medicine list with you in case of an emergency.    Care for your wound as directed:     Do not get your wound wet until your healthcare provider says it is okay. Do not soak your wound in water. Do not go swimming until your healthcare provider says it is okay. Carefully wash the wound with soap and water. Gently pat the area dry or allow it to air dry.       Change your bandages when they get wet, dirty, or after washing. Apply new, clean bandages as directed. Do not apply elastic bandages or tape too tight. Do not put powders or lotions over your incision.       Apply antibiotic ointment as directed. Your healthcare provider may give you antibiotic ointment to put over your wound if you have stitches. If you have strips of tape over your incision, let them dry up and fall off on their own. If they do not fall off within 14 days, gently remove them. If you have glue over your wound, do not remove or pick at it. If your glue comes off, do not replace it with glue that you have at home.       Check your wound every day for signs of infection such as swelling, redness, or pus.     Self-care:     Apply ice on your wound for 15 to 20 minutes every hour or as directed. Use an ice pack, or put crushed ice in a plastic bag. Cover it with a towel. Ice helps prevent tissue damage and decreases swelling and pain.      Use a splint as directed. A splint will decrease movement and stress on your wound. It may help it heal faster. A splint may be used for lacerations over joints or areas of your body that bend. Ask your healthcare provider how to apply and remove a splint.       Decrease scarring of your wound by applying ointments as directed. Do not apply ointments until your healthcare provider says it is okay. You may need to wait until your wound is healed. Ask which ointment to buy and how often to use it. After your wound is healed, use sunscreen over the area when you are out in the sun. You should do this for at least 6 months to 1 year after your injury.     Follow up with your healthcare provider as directed: You may need to follow up in 24 to 48 hours to have your wound checked for infection. You will need to return in 3 to 14 days if you have stitches or staples so they can be removed. Care for your wound as directed to prevent infection and help it heal. Write down your questions so you remember to ask them during your visits.       © Copyright [employer] 2019 All illustrations and images included in CareNotes are the copyrighted property of [employer] or [employer]. Return to the ED in 14 days for removal of sutures. Please follow up with Dr. Brock in 1 week for wound check. Please rest and elevate your leg when possible. Please ambulate with crutches in order to limit movement of your foot. Please return to the ED sooner than 14 days if you notice redness of the skin, warmth, drainage, or other new/concerning symptoms. Take Keflex for 7 days to prevent infection.     Our Emergency Department Referral Coordinators will be reaching out to you in the next 24-48 hours from 9:00am to 5:00pm with a follow up appointment. Please expect a phone call from the hospital in that time frame. If you do not receive a call or if you have any questions or concerns, you can reach them at [phone]     Laceration    WHAT YOU NEED TO KNOW:    A laceration is an injury to the skin and the soft tissue underneath it. Lacerations happen when you are cut or hit by something. They can happen anywhere on the body.     DISCHARGE INSTRUCTIONS:    Return to the emergency department if:     You have heavy bleeding or bleeding that does not stop after 10 minutes of holding firm, direct pressure over the wound.       Your wound opens up.     Contact your healthcare provider if:     You have a fever or chills.       Your laceration is red, warm, or swollen.      You have red streaks on your skin coming from your wound.      You have white or yellow drainage from the wound that smells bad.      You have pain that gets worse, even after treatment.       You have questions or concerns about your condition or care.     Medicines:     Prescription pain medicine may be given. Ask how to take this medicine safely.       Antibiotics help treat or prevent a bacterial infection.       Take your medicine as directed. Contact your healthcare provider if you think your medicine is not helping or if you have side effects. Tell him or her if you are allergic to any medicine. Keep a list of the medicines, vitamins, and herbs you take. Include the amounts, and when and why you take them. Bring the list or the pill bottles to follow-up visits. Carry your medicine list with you in case of an emergency.    Care for your wound as directed:     Do not get your wound wet until your healthcare provider says it is okay. Do not soak your wound in water. Do not go swimming until your healthcare provider says it is okay. Carefully wash the wound with soap and water. Gently pat the area dry or allow it to air dry.       Change your bandages when they get wet, dirty, or after washing. Apply new, clean bandages as directed. Do not apply elastic bandages or tape too tight. Do not put powders or lotions over your incision.       Apply antibiotic ointment as directed. Your healthcare provider may give you antibiotic ointment to put over your wound if you have stitches. If you have strips of tape over your incision, let them dry up and fall off on their own. If they do not fall off within 14 days, gently remove them. If you have glue over your wound, do not remove or pick at it. If your glue comes off, do not replace it with glue that you have at home.       Check your wound every day for signs of infection such as swelling, redness, or pus.     Self-care:     Apply ice on your wound for 15 to 20 minutes every hour or as directed. Use an ice pack, or put crushed ice in a plastic bag. Cover it with a towel. Ice helps prevent tissue damage and decreases swelling and pain.      Use a splint as directed. A splint will decrease movement and stress on your wound. It may help it heal faster. A splint may be used for lacerations over joints or areas of your body that bend. Ask your healthcare provider how to apply and remove a splint.       Decrease scarring of your wound by applying ointments as directed. Do not apply ointments until your healthcare provider says it is okay. You may need to wait until your wound is healed. Ask which ointment to buy and how often to use it. After your wound is healed, use sunscreen over the area when you are out in the sun. You should do this for at least 6 months to 1 year after your injury.     Follow up with your healthcare provider as directed: You may need to follow up in 24 to 48 hours to have your wound checked for infection. You will need to return in 3 to 14 days if you have stitches or staples so they can be removed. Care for your wound as directed to prevent infection and help it heal. Write down your questions so you remember to ask them during your visits.       © Copyright [employer] 2019 All illustrations and images included in CareNotes are the copyrighted property of [employer] or [employer].